# Patient Record
Sex: MALE | Race: WHITE | NOT HISPANIC OR LATINO
[De-identification: names, ages, dates, MRNs, and addresses within clinical notes are randomized per-mention and may not be internally consistent; named-entity substitution may affect disease eponyms.]

---

## 2020-03-02 ENCOUNTER — APPOINTMENT (OUTPATIENT)
Dept: PULMONOLOGY | Facility: CLINIC | Age: 65
End: 2020-03-02
Payer: MEDICARE

## 2020-03-02 VITALS
TEMPERATURE: 98 F | RESPIRATION RATE: 16 BRPM | OXYGEN SATURATION: 94 % | HEART RATE: 70 BPM | SYSTOLIC BLOOD PRESSURE: 140 MMHG | WEIGHT: 220 LBS | BODY MASS INDEX: 30.8 KG/M2 | HEIGHT: 71 IN | DIASTOLIC BLOOD PRESSURE: 76 MMHG

## 2020-03-02 DIAGNOSIS — Z86.79 PERSONAL HISTORY OF OTHER DISEASES OF THE CIRCULATORY SYSTEM: ICD-10-CM

## 2020-03-02 DIAGNOSIS — Z86.39 PERSONAL HISTORY OF OTHER ENDOCRINE, NUTRITIONAL AND METABOLIC DISEASE: ICD-10-CM

## 2020-03-02 PROBLEM — Z00.00 ENCOUNTER FOR PREVENTIVE HEALTH EXAMINATION: Status: ACTIVE | Noted: 2020-03-02

## 2020-03-02 PROCEDURE — 94060 EVALUATION OF WHEEZING: CPT

## 2020-03-02 PROCEDURE — 99204 OFFICE O/P NEW MOD 45 MIN: CPT | Mod: 25

## 2020-03-02 PROCEDURE — 99406 BEHAV CHNG SMOKING 3-10 MIN: CPT

## 2020-03-02 PROCEDURE — 94729 DIFFUSING CAPACITY: CPT

## 2020-03-02 PROCEDURE — 94727 GAS DIL/WSHOT DETER LNG VOL: CPT

## 2020-03-02 RX ORDER — ATORVASTATIN CALCIUM 80 MG/1
TABLET, FILM COATED ORAL
Refills: 0 | Status: ACTIVE | COMMUNITY

## 2020-03-02 RX ORDER — RAMIPRIL 10 MG/1
10 CAPSULE ORAL
Refills: 0 | Status: ACTIVE | COMMUNITY

## 2020-03-02 RX ORDER — METOPROLOL SUCCINATE 200 MG/1
TABLET, EXTENDED RELEASE ORAL
Refills: 0 | Status: ACTIVE | COMMUNITY

## 2020-03-02 NOTE — COUNSELING
[Risk of tobacco use and health benefits of smoking cessation discussed] : Risk of tobacco use and health benefits of smoking cessation discussed [Tobacco Use Cessation Intermediate Greater Than 3 Minutes Up to 10 Minutes] : Tobacco Use Cessation Intermediate Greater Than 3 Minutes Up to 10 Minutes [FreeTextEntry1] : 6

## 2020-03-02 NOTE — PHYSICAL EXAM
[No Acute Distress] : no acute distress [Normal Oropharynx] : normal oropharynx [II] : Mallampati Class: II [Normal Appearance] : normal appearance [Normal S1, S2] : normal s1, s2 [No Resp Distress] : no resp distress [No Abnormalities] : no abnormalities [Benign] : benign [Normal Gait] : normal gait [No Clubbing] : no clubbing [No Focal Deficits] : no focal deficits [No Edema] : no edema [Oriented x3] : oriented x3

## 2020-03-02 NOTE — DISCUSSION/SUMMARY
[FreeTextEntry1] : 64 year old smoker with a history of hypertension, hyperlipidemia, coronary artery disease, S/P CABG presented with two pulmonary nodular opacities at the left leg lung base. Largest was 12 x 6 mm on most recent Chest CT. \par \par Malignancy needs to be ruled out. Will get PET/CT. Also will get images from [facility]. \par \par Smoking cessation discussed. \par \par Follow up after the above.

## 2020-03-02 NOTE — PROCEDURE
[FreeTextEntry1] : PFT 3/2/20: No obstruction. Moderate restriction. Mildly reduced diffusion. No change after bronchodilator. \par \par CT Chest ([facility]) 2/23/15: Scarring at the left lung base. No change from 8/2/12. \par \par CT Chest ([facility]) 12/16/17: Two subpleural nodules at the left base (specific size not reported). No change from prior CT of 2/15. \par \par CT Chest 1/8/20 ([facility]): Emphysematous changes. Two subpleural opacities in the left lower lobe measuring 12 x 6 mm and 9 x 7 mm.

## 2020-03-02 NOTE — HISTORY OF PRESENT ILLNESS
[Current] : current [TextBox_13] : 50 [TextBox_4] : 64 year old smoker referred for pulmonary nodules noted on chest CT. No cough, shortness of breath or hemoptysis. Smoked 1 1/2 packs per day. Has been smoking over 50 years.

## 2020-03-02 NOTE — REVIEW OF SYSTEMS
[Fever] : no fever [Cough] : no cough [Dyspnea] : no dyspnea [Sputum] : no sputum [Wheezing] : no wheezing [Chest Discomfort] : no chest discomfort [Nasal Discharge] : no nasal discharge [Nocturia] : no nocturia [Abdominal Pain] : no abdominal pain [Myalgias] : no myalgias [Rash] : no rash [Headache] : no headache [Anemia] : no anemia [Depression] : no depression [Diabetes] : no diabetes [Thyroid Problem] : no thyroid problem

## 2020-03-16 ENCOUNTER — APPOINTMENT (OUTPATIENT)
Dept: PULMONOLOGY | Facility: CLINIC | Age: 65
End: 2020-03-16
Payer: MEDICARE

## 2020-03-16 VITALS
OXYGEN SATURATION: 97 % | HEIGHT: 71 IN | TEMPERATURE: 98.2 F | BODY MASS INDEX: 31.36 KG/M2 | WEIGHT: 224 LBS | DIASTOLIC BLOOD PRESSURE: 83 MMHG | SYSTOLIC BLOOD PRESSURE: 150 MMHG | RESPIRATION RATE: 16 BRPM | HEART RATE: 67 BPM

## 2020-03-16 VITALS — SYSTOLIC BLOOD PRESSURE: 140 MMHG | DIASTOLIC BLOOD PRESSURE: 78 MMHG

## 2020-03-16 PROCEDURE — 99214 OFFICE O/P EST MOD 30 MIN: CPT

## 2020-03-16 NOTE — PROCEDURE
[FreeTextEntry1] : PFT 3/2/20: No obstruction. Moderate restriction. Mildly reduced diffusion. No change after bronchodilator. \par \par CT Chest ([facility]) 2/23/15: Scarring at the left lung base. No change from 8/2/12. \par \par CT Chest ([facility]) 12/16/17: Two subpleural nodules at the left base (specific size not reported). No change from prior CT of 2/15. \par \par CT Chest 1/8/20 ([facility]): Emphysematous changes. Two subpleural opacities in the left lower lobe measuring 12 x 6 mm and 9 x 7 mm. \par \par PET/CT - 3/10/20: No findings consistent with malignancy.

## 2020-03-16 NOTE — DISCUSSION/SUMMARY
[FreeTextEntry1] : He is a 64 year old smoker with a history of hypertension, hyperlipidemia, coronary artery disease, S/P CABG who presented with two pulmonary nodular opacities at the left leg lung base noted on Chest CT. The largest opacity was 12 x 6 mm on the Chest CT of 1/8/20. PET/CT of 3/8/20 without evidence of malignancy. \par \par No further investigation needed now. Smoking cessation discussed again. \par \par Follow up after the above.

## 2020-03-16 NOTE — HISTORY OF PRESENT ILLNESS
[Current] : current [TextBox_4] : He is a 64 year old smoker referred for pulmonary nodules noted on chest CT. Denied any chronic cough, shortness of breath or hemoptysis. Smokes about 11/2 packs per day. Has been smoking over 50 years.  [TextBox_13] : 50

## 2020-03-16 NOTE — COUNSELING
[Risk of tobacco use and health benefits of smoking cessation discussed] : Risk of tobacco use and health benefits of smoking cessation discussed [Tobacco Use Cessation Intermediate Greater Than 3 Minutes Up to 10 Minutes] : Tobacco Use Cessation Intermediate Greater Than 3 Minutes Up to 10 Minutes [FreeTextEntry1] : 5

## 2020-03-16 NOTE — REVIEW OF SYSTEMS
[Fever] : no fever [Chills] : no chills [Cough] : no cough [Hemoptysis] : no hemoptysis [Sputum] : no sputum [Dyspnea] : no dyspnea [Pleuritic Pain] : no pleuritic pain [Wheezing] : no wheezing [Chest Discomfort] : no chest discomfort [Nasal Discharge] : no nasal discharge [Abdominal Pain] : no abdominal pain [Nocturia] : no nocturia [Myalgias] : no myalgias [Rash] : no rash [Anemia] : no anemia [Headache] : no headache [Depression] : no depression [Diabetes] : no diabetes [Thyroid Problem] : no thyroid problem

## 2020-03-16 NOTE — PHYSICAL EXAM
[Normal Oropharynx] : normal oropharynx [II] : Mallampati Class: II [Normal Appearance] : normal appearance [Normal S1, S2] : normal s1, s2 [No Resp Distress] : no resp distress [No Abnormalities] : no abnormalities [Benign] : benign [Normal Gait] : normal gait [No Clubbing] : no clubbing [No Edema] : no edema [No Focal Deficits] : no focal deficits [Oriented x3] : oriented x3 [Well Groomed] : well groomed [Not Tender] : not tender [TextBox_80] : Few rhonchi noted

## 2021-04-01 ENCOUNTER — APPOINTMENT (OUTPATIENT)
Dept: PULMONOLOGY | Facility: CLINIC | Age: 66
End: 2021-04-01
Payer: MEDICARE

## 2021-04-01 VITALS
WEIGHT: 224 LBS | OXYGEN SATURATION: 95 % | RESPIRATION RATE: 16 BRPM | BODY MASS INDEX: 31.36 KG/M2 | HEIGHT: 71 IN | HEART RATE: 63 BPM | DIASTOLIC BLOOD PRESSURE: 80 MMHG | TEMPERATURE: 98 F | SYSTOLIC BLOOD PRESSURE: 142 MMHG

## 2021-04-01 PROCEDURE — 99213 OFFICE O/P EST LOW 20 MIN: CPT

## 2021-04-01 NOTE — REVIEW OF SYSTEMS
[Fever] : no fever [Cough] : no cough [Hemoptysis] : no hemoptysis [Sputum] : no sputum [Dyspnea] : no dyspnea [Wheezing] : no wheezing [Chest Discomfort] : no chest discomfort [Nasal Discharge] : no nasal discharge [Abdominal Pain] : no abdominal pain [Nocturia] : no nocturia [Myalgias] : no myalgias [Rash] : no rash [Anemia] : no anemia [Headache] : no headache [Depression] : no depression [Diabetes] : no diabetes [Thyroid Problem] : no thyroid problem

## 2021-04-01 NOTE — DISCUSSION/SUMMARY
[FreeTextEntry1] : He is a 65 year old smoker with a history of hypertension, hyperlipidemia, coronary artery disease, S/P CABG who presented with two pulmonary nodular opacities at the left leg lung base noted on Chest CT. The largest opacity was 12 x 6 mm on the Chest CT of 1/8/20. PET/CT of 3/8/20 without evidence of malignancy. \par \par I will obtain the recent CT results and review them. If necessary a followup CT of the lungs will be obtained.\par \par Smoking cessation discussed again. \par \par Followup in 6 months advised.

## 2021-04-01 NOTE — COUNSELING
[Risk of tobacco use and health benefits of smoking cessation discussed] : Risk of tobacco use and health benefits of smoking cessation discussed [FreeTextEntry1] : 5

## 2021-04-01 NOTE — HISTORY OF PRESENT ILLNESS
[Current] : current [TextBox_4] : He is a 65 year old smoker referred for pulmonary nodules noted on chest CT. \par \par He recently had a followup CT for his aortic aneurysm. He did not have the results with him.\par \par Denied any chronic cough, shortness of breath or hemoptysis. \par \par Smokes about 1 pack per day. Has been smoking over 50 years.  [TextBox_11] : 1 [TextBox_13] : 50

## 2021-04-01 NOTE — PHYSICAL EXAM
[Well Groomed] : well groomed [II] : Mallampati Class: II [Normal Appearance] : normal appearance [Normal S1, S2] : normal s1, s2 [No Resp Distress] : no resp distress [No Abnormalities] : no abnormalities [Not Tender] : not tender [Normal Gait] : normal gait [No Clubbing] : no clubbing [No Edema] : no edema [No Focal Deficits] : no focal deficits [Oriented x3] : oriented x3 [No HSM] : no hsm

## 2021-05-27 ENCOUNTER — NON-APPOINTMENT (OUTPATIENT)
Age: 66
End: 2021-05-27

## 2021-10-07 ENCOUNTER — APPOINTMENT (OUTPATIENT)
Dept: PULMONOLOGY | Facility: CLINIC | Age: 66
End: 2021-10-07
Payer: MEDICARE

## 2021-10-07 VITALS
HEART RATE: 63 BPM | DIASTOLIC BLOOD PRESSURE: 78 MMHG | SYSTOLIC BLOOD PRESSURE: 154 MMHG | TEMPERATURE: 97.3 F | WEIGHT: 222 LBS | BODY MASS INDEX: 30.96 KG/M2 | OXYGEN SATURATION: 97 %

## 2021-10-07 VITALS — SYSTOLIC BLOOD PRESSURE: 142 MMHG | DIASTOLIC BLOOD PRESSURE: 78 MMHG

## 2021-10-07 PROCEDURE — 99213 OFFICE O/P EST LOW 20 MIN: CPT

## 2021-10-07 NOTE — DISCUSSION/SUMMARY
[FreeTextEntry1] : He is a 66 year old smoker with a history of hypertension, hyperlipidemia, coronary artery disease, S/P CABG who presented with two pulmonary nodular opacities at the left leg lung base noted on Chest CT. The largest opacity was 12 x 6 mm on the Chest CT of 1/8/20. PET/CT of 3/8/20 without evidence of malignancy. CT 5/13//21: Scarring unchanged. No suspicious nodules. \par \par No evidence of malignancy. Smoking cessation advised. Follow up CT in 6 months. \par \par Needs new PCP. Dr. Bland has retired.

## 2021-10-07 NOTE — PHYSICAL EXAM
[Well Groomed] : well groomed [II] : Mallampati Class: II [Normal Appearance] : normal appearance [Normal S1, S2] : normal s1, s2 [No Resp Distress] : no resp distress [No Abnormalities] : no abnormalities [Not Tender] : not tender [No HSM] : no hsm [Normal Gait] : normal gait [No Clubbing] : no clubbing [No Edema] : no edema [No Focal Deficits] : no focal deficits [Oriented x3] : oriented x3

## 2022-01-20 ENCOUNTER — APPOINTMENT (OUTPATIENT)
Dept: UROLOGY | Facility: CLINIC | Age: 67
End: 2022-01-20
Payer: MEDICARE

## 2022-01-20 VITALS
HEIGHT: 71 IN | SYSTOLIC BLOOD PRESSURE: 158 MMHG | BODY MASS INDEX: 30.8 KG/M2 | TEMPERATURE: 98 F | HEART RATE: 67 BPM | DIASTOLIC BLOOD PRESSURE: 77 MMHG | WEIGHT: 220 LBS

## 2022-01-20 VITALS — DIASTOLIC BLOOD PRESSURE: 72 MMHG | SYSTOLIC BLOOD PRESSURE: 139 MMHG

## 2022-01-20 DIAGNOSIS — Z12.5 ENCOUNTER FOR SCREENING FOR MALIGNANT NEOPLASM OF PROSTATE: ICD-10-CM

## 2022-01-20 DIAGNOSIS — F17.200 NICOTINE DEPENDENCE, UNSPECIFIED, UNCOMPLICATED: ICD-10-CM

## 2022-01-20 PROCEDURE — 99204 OFFICE O/P NEW MOD 45 MIN: CPT

## 2022-01-20 PROCEDURE — 99406 BEHAV CHNG SMOKING 3-10 MIN: CPT

## 2022-01-20 RX ORDER — CLOPIDOGREL 75 MG/1
75 TABLET, FILM COATED ORAL
Refills: 0 | Status: ACTIVE | COMMUNITY

## 2022-01-20 RX ORDER — ASPIRIN 81 MG
81 TABLET, DELAYED RELEASE (ENTERIC COATED) ORAL
Refills: 0 | Status: ACTIVE | COMMUNITY

## 2022-01-21 NOTE — LETTER BODY
[FreeTextEntry1] : Wilbur Austin MD\par [address], \[place] [address]\par \par Dear Dr. Austin,\par \par Kailash Naranjo presents to the office today. He is a 66-year-old man who is here today for evaluation of lower urinary tract symptoms and prostate cancer screening. He has undergone previous treatment for BPH with greenlight photo vaporization of the prostate about 2 years ago. He says this "worked a little" and at that time his symptoms were urgency and urge urinary incontinence. He can hold his urine better now than he used to be able to but still has some urgency and frequency symptoms. He states that his flow is good at times and less so at others. He denies any sensation of incomplete bladder emptying. There is no gross hematuria or dysuria. He denies any urinary tract infections.\par \par Of note, the patient is a smoker. He is a former cocaine user. He has history of coronary artery disease with prior stenting and a coronary artery bypass. He also states that he " thinks he also has a stent in his leg."\par \par We reviewed his urinary symptoms today and potential treatment options to consider. I think he likely has still some element of BPH and bladder outlet obstruction related to his ongoing symptoms of frequency and urgency. He is clearly better than he had been prior to the greenlight laser procedure. I offered him options of medical therapy but he would prefer to remain off of any medication at this time unless his symptoms start to be progressively worse.\par \par We discussed prostate cancer screening today also in detail. He is unaware of prior PSA levels and would like it checked today which I did for him. His PSA was 3.77 ng/mL with 53% free fraction. These levels are favorable and I do not think that he needs to consider undergoing prostate imaging or biopsy at this time. I would recommend he continue to have his PSA checked on an intermittent basis, yearly would be fine in my opinion.\par \par He also asked today about erectile dysfunction. He says that he has not had a good quality erection for years and does not have the ability to achieve partial erection adequate for penetration. We did discuss options for management such as oral PDE 5 inhibitors and penile injection treatments. He says that he has tried these in the past and has not had success. He is potentially interested in penile implant surgery. I have referred him to a colleague with expertise in urologic prosthetic surgery for further discussion.\par \par I will plan to see him back again next year. If his urinary symptoms get any worse in the interval, I would be happy to see him at any time.\par \par Sincerely,\par \par \par \par \par Vern Mcdonnell MD, FACS\par  of Urology\par Residency \par [facility] [employer] [facility]\par \par [facility]\par Director of Robotics and Minimally Invasive Surgery\par [address] [place] [address]\[place] [address]\par P: [phone]\par F: [phone]\par [facility]

## 2022-01-21 NOTE — ASSESSMENT
[FreeTextEntry1] : I had an approximately 5-minute discussion today with the patient regarding smoking cessation which was separate from the rest of the visit.  I have explained that smoking can have association with urinary tract malignancy including urothelial cell carcinoma.  We also reviewed the multiple other benefits of smoking cessation including cardiovascular disease, pulmonary disease, renal disease, increased risk of malignancy, amongst many others.\par

## 2022-01-25 LAB
PSA FREE FLD-MCNC: 53 %
PSA FREE SERPL-MCNC: 1.98 NG/ML
PSA SERPL-MCNC: 3.77 NG/ML

## 2022-04-07 ENCOUNTER — APPOINTMENT (OUTPATIENT)
Dept: PULMONOLOGY | Facility: CLINIC | Age: 67
End: 2022-04-07

## 2022-04-14 ENCOUNTER — APPOINTMENT (OUTPATIENT)
Dept: PULMONOLOGY | Facility: CLINIC | Age: 67
End: 2022-04-14

## 2022-05-02 ENCOUNTER — APPOINTMENT (OUTPATIENT)
Dept: PULMONOLOGY | Facility: CLINIC | Age: 67
End: 2022-05-02
Payer: MEDICARE

## 2022-05-02 VITALS
WEIGHT: 222 LBS | HEART RATE: 62 BPM | SYSTOLIC BLOOD PRESSURE: 130 MMHG | DIASTOLIC BLOOD PRESSURE: 66 MMHG | BODY MASS INDEX: 30.96 KG/M2 | TEMPERATURE: 97.8 F | OXYGEN SATURATION: 95 %

## 2022-05-02 DIAGNOSIS — Z72.0 TOBACCO USE: ICD-10-CM

## 2022-05-02 DIAGNOSIS — R91.1 SOLITARY PULMONARY NODULE: ICD-10-CM

## 2022-05-02 PROCEDURE — 99213 OFFICE O/P EST LOW 20 MIN: CPT

## 2022-05-02 NOTE — DISCUSSION/SUMMARY
[FreeTextEntry1] : He is a 66 year old smoker with a history of hypertension, hyperlipidemia, coronary artery disease, S/P CABG who presented with two pulmonary nodular opacities at the left leg lung base noted on Chest CT. The largest opacity was 12 x 6 mm on the Chest CT of 1/8/20. PET/CT of 3/8/20 without evidence of malignancy. CT 5/13//21: Scarring unchanged. No suspicious nodules. \par \par CT Chest 4/7/22: Stable 3 mm RUL nodule. Stable scarring. \par \par His pulmonary status is stable. \par \par Follow up CT Chest in one year advised. Smoking cessation discussed. \par

## 2022-05-02 NOTE — HISTORY OF PRESENT ILLNESS
[Current] : current [TextBox_4] : He is a 65 year old smoker referred for pulmonary nodules noted on chest CT. \par \par The patient came for a scheduled follow up visit today. \par \par  [TextBox_11] : 1 [TextBox_13] : 50

## 2022-05-02 NOTE — REVIEW OF SYSTEMS
[Fever] : no fever [Cough] : no cough [Hemoptysis] : no hemoptysis [Dyspnea] : no dyspnea [Wheezing] : no wheezing [Chest Discomfort] : no chest discomfort [Nasal Discharge] : no nasal discharge [Abdominal Pain] : no abdominal pain [Nocturia] : no nocturia [Myalgias] : no myalgias [Rash] : no rash [Anemia] : no anemia [Headache] : no headache [Depression] : no depression [Diabetes] : no diabetes [Thyroid Problem] : no thyroid problem

## 2022-05-02 NOTE — PROCEDURE
[FreeTextEntry1] : PFT 3/2/20: No obstruction. Moderate restriction. Mildly reduced diffusion. No change after bronchodilator. \par \par CT Chest ([facility]) 2/23/15: Scarring at the left lung base. No change from 8/2/12. \par \par CT Chest ([facility]) 12/16/17: Two subpleural nodules at the left base (specific size not reported). No change from prior CT of 2/15. \par \par CT Chest 1/8/20 ([facility]): Emphysematous changes. Two subpleural opacities in the left lower lobe measuring 12 x 6 mm and 9 x 7 mm. \par \par PET/CT - 3/10/20: No findings consistent with malignancy. \par \par CT Chest 4/7/22: Stable 3 mm RUL nodule. Stable scarring. \par

## 2022-08-30 ENCOUNTER — APPOINTMENT (OUTPATIENT)
Dept: UROLOGY | Facility: CLINIC | Age: 67
End: 2022-08-30

## 2022-08-30 VITALS
SYSTOLIC BLOOD PRESSURE: 139 MMHG | HEART RATE: 59 BPM | DIASTOLIC BLOOD PRESSURE: 64 MMHG | RESPIRATION RATE: 17 BRPM | HEIGHT: 71 IN | TEMPERATURE: 98.2 F

## 2022-08-30 DIAGNOSIS — N52.9 MALE ERECTILE DYSFUNCTION, UNSPECIFIED: ICD-10-CM

## 2022-08-30 PROCEDURE — 99214 OFFICE O/P EST MOD 30 MIN: CPT

## 2022-08-30 RX ORDER — ALPROSTADIL 500 UG/ML
500 INJECTION, SOLUTION, CONCENTRATE INTRAVASCULAR
Qty: 2 | Refills: 0 | Status: ACTIVE | COMMUNITY
Start: 2022-08-30 | End: 1900-01-01

## 2022-08-30 NOTE — PHYSICAL EXAM
[General Appearance - Well Developed] : well developed [General Appearance - Well Nourished] : well nourished [Normal Appearance] : normal appearance [Well Groomed] : well groomed [General Appearance - In No Acute Distress] : no acute distress [Urethral Meatus] : meatus normal [Testes Tenderness] : no tenderness of the testes [Testes Mass (___cm)] : there were no testicular masses [FreeTextEntry1] : stretched penile length demonstrated to pt.  Skin adhesion noted on glans to foreskin

## 2022-08-30 NOTE — ASSESSMENT
[FreeTextEntry1] : Patient is a 67 yo M who presents with severe ED.\par \par Reviewed treatment options.\par Discussed at length the penile prosthesis surgery.  Discussed that there is a malleable prosthesis as well as inflatable prosthesis.  Patient is more interested in the inflatable penile prosthesis.  Discussed risks/benefits/alternatives of procedure and typical postoperative course including no use/sex for 4-6 wks after surgery.  Risks including but not limited to bleeding, infection, penile length shortening, device malfunction, erosion, perforation, and postoperative pain discussed at length.  I also emphasized that this is a permanent ED procedure, pt understands that he cannot resume oral or injectable medication for erections after prosthesis surgery. Pt also understands that the typical life expectancy of the device is ~10 years and that it may need to be revised in the future.\par After learning about his penile length, stretched penile length he is less interested in penile implant.\par D/w pt that given he had not tried ICI, would give trial and perform penile doppler.\par He is willing to undergo penile doppler ULT\par F/u for penile doppler ULT\par

## 2022-08-30 NOTE — HISTORY OF PRESENT ILLNESS
[FreeTextEntry1] : Patient is a 65 yo M who presents for ED.\par He has tried multiple oral pills - cialis, viagra.\par He has not tried ICI and has read about penile prosthesis.  He is interested in penile prosthesis.\par He had CABG 15 yrs ago, no CP.  No nitrate use.  He walks for 1 mile on exercise machine without CP/SOB.\par \par He has not had intercourse in 8 yrs, he is  but his wife is not overly bothered by lack of sex.\par

## 2022-09-17 ENCOUNTER — EMERGENCY (EMERGENCY)
Facility: HOSPITAL | Age: 67
LOS: 1 days | Discharge: LEFT BEFORE DISCHARGE | End: 2022-09-17
Attending: STUDENT IN AN ORGANIZED HEALTH CARE EDUCATION/TRAINING PROGRAM | Admitting: STUDENT IN AN ORGANIZED HEALTH CARE EDUCATION/TRAINING PROGRAM

## 2022-09-17 VITALS
TEMPERATURE: 98 F | OXYGEN SATURATION: 99 % | HEART RATE: 67 BPM | SYSTOLIC BLOOD PRESSURE: 159 MMHG | RESPIRATION RATE: 17 BRPM | DIASTOLIC BLOOD PRESSURE: 65 MMHG

## 2022-09-17 LAB
ALBUMIN SERPL ELPH-MCNC: 3.6 G/DL — SIGNIFICANT CHANGE UP (ref 3.3–5)
ALP SERPL-CCNC: 44 U/L — SIGNIFICANT CHANGE UP (ref 40–120)
ALT FLD-CCNC: 18 U/L — SIGNIFICANT CHANGE UP (ref 4–41)
ANION GAP SERPL CALC-SCNC: 13 MMOL/L — SIGNIFICANT CHANGE UP (ref 7–14)
APPEARANCE UR: CLEAR — SIGNIFICANT CHANGE UP
APTT BLD: 30.3 SEC — SIGNIFICANT CHANGE UP (ref 27–36.3)
AST SERPL-CCNC: 17 U/L — SIGNIFICANT CHANGE UP (ref 4–40)
BASOPHILS # BLD AUTO: 0.02 K/UL — SIGNIFICANT CHANGE UP (ref 0–0.2)
BASOPHILS NFR BLD AUTO: 0.2 % — SIGNIFICANT CHANGE UP (ref 0–2)
BILIRUB SERPL-MCNC: 0.4 MG/DL — SIGNIFICANT CHANGE UP (ref 0.2–1.2)
BILIRUB UR-MCNC: NEGATIVE — SIGNIFICANT CHANGE UP
BUN SERPL-MCNC: 117 MG/DL — HIGH (ref 7–23)
CALCIUM SERPL-MCNC: 8.6 MG/DL — SIGNIFICANT CHANGE UP (ref 8.4–10.5)
CHLORIDE SERPL-SCNC: 107 MMOL/L — SIGNIFICANT CHANGE UP (ref 98–107)
CK SERPL-CCNC: 78 U/L — SIGNIFICANT CHANGE UP (ref 30–200)
CO2 SERPL-SCNC: 18 MMOL/L — LOW (ref 22–31)
COLOR SPEC: YELLOW — SIGNIFICANT CHANGE UP
CREAT SERPL-MCNC: 2.4 MG/DL — HIGH (ref 0.5–1.3)
DIFF PNL FLD: NEGATIVE — SIGNIFICANT CHANGE UP
EGFR: 29 ML/MIN/1.73M2 — LOW
EOSINOPHIL # BLD AUTO: 0.08 K/UL — SIGNIFICANT CHANGE UP (ref 0–0.5)
EOSINOPHIL NFR BLD AUTO: 0.8 % — SIGNIFICANT CHANGE UP (ref 0–6)
GLUCOSE SERPL-MCNC: 165 MG/DL — HIGH (ref 70–99)
GLUCOSE UR QL: ABNORMAL
HCT VFR BLD CALC: 40.1 % — SIGNIFICANT CHANGE UP (ref 39–50)
HGB BLD-MCNC: 13.4 G/DL — SIGNIFICANT CHANGE UP (ref 13–17)
IANC: 7.6 K/UL — HIGH (ref 1.8–7.4)
IMM GRANULOCYTES NFR BLD AUTO: 0.6 % — SIGNIFICANT CHANGE UP (ref 0–0.9)
INR BLD: 1.14 RATIO — SIGNIFICANT CHANGE UP (ref 0.88–1.16)
KETONES UR-MCNC: NEGATIVE — SIGNIFICANT CHANGE UP
LEUKOCYTE ESTERASE UR-ACNC: NEGATIVE — SIGNIFICANT CHANGE UP
LYMPHOCYTES # BLD AUTO: 0.92 K/UL — LOW (ref 1–3.3)
LYMPHOCYTES # BLD AUTO: 9.4 % — LOW (ref 13–44)
MCHC RBC-ENTMCNC: 31.2 PG — SIGNIFICANT CHANGE UP (ref 27–34)
MCHC RBC-ENTMCNC: 33.4 GM/DL — SIGNIFICANT CHANGE UP (ref 32–36)
MCV RBC AUTO: 93.5 FL — SIGNIFICANT CHANGE UP (ref 80–100)
MONOCYTES # BLD AUTO: 1.14 K/UL — HIGH (ref 0–0.9)
MONOCYTES NFR BLD AUTO: 11.6 % — SIGNIFICANT CHANGE UP (ref 2–14)
NEUTROPHILS # BLD AUTO: 7.6 K/UL — HIGH (ref 1.8–7.4)
NEUTROPHILS NFR BLD AUTO: 77.4 % — HIGH (ref 43–77)
NITRITE UR-MCNC: NEGATIVE — SIGNIFICANT CHANGE UP
NRBC # BLD: 0 /100 WBCS — SIGNIFICANT CHANGE UP (ref 0–0)
NRBC # FLD: 0 K/UL — SIGNIFICANT CHANGE UP (ref 0–0)
NT-PROBNP SERPL-SCNC: 6349 PG/ML — HIGH
PH UR: 6 — SIGNIFICANT CHANGE UP (ref 5–8)
PLATELET # BLD AUTO: 104 K/UL — LOW (ref 150–400)
POTASSIUM SERPL-MCNC: 4.5 MMOL/L — SIGNIFICANT CHANGE UP (ref 3.5–5.3)
POTASSIUM SERPL-SCNC: 4.5 MMOL/L — SIGNIFICANT CHANGE UP (ref 3.5–5.3)
PROT SERPL-MCNC: 6.3 G/DL — SIGNIFICANT CHANGE UP (ref 6–8.3)
PROT UR-MCNC: ABNORMAL
PROTHROM AB SERPL-ACNC: 13.2 SEC — SIGNIFICANT CHANGE UP (ref 10.5–13.4)
RBC # BLD: 4.29 M/UL — SIGNIFICANT CHANGE UP (ref 4.2–5.8)
RBC # FLD: 13.7 % — SIGNIFICANT CHANGE UP (ref 10.3–14.5)
SODIUM SERPL-SCNC: 138 MMOL/L — SIGNIFICANT CHANGE UP (ref 135–145)
SP GR SPEC: 1.02 — SIGNIFICANT CHANGE UP (ref 1.01–1.05)
TROPONIN T, HIGH SENSITIVITY RESULT: 1073 NG/L — CRITICAL HIGH
TROPONIN T, HIGH SENSITIVITY RESULT: 1166 NG/L — CRITICAL HIGH
UROBILINOGEN FLD QL: SIGNIFICANT CHANGE UP
WBC # BLD: 9.82 K/UL — SIGNIFICANT CHANGE UP (ref 3.8–10.5)
WBC # FLD AUTO: 9.82 K/UL — SIGNIFICANT CHANGE UP (ref 3.8–10.5)

## 2022-09-17 PROCEDURE — 93010 ELECTROCARDIOGRAM REPORT: CPT

## 2022-09-17 PROCEDURE — 99291 CRITICAL CARE FIRST HOUR: CPT | Mod: FT,CS,25

## 2022-09-17 PROCEDURE — 71046 X-RAY EXAM CHEST 2 VIEWS: CPT | Mod: 26

## 2022-09-17 RX ORDER — FUROSEMIDE 40 MG
40 TABLET ORAL ONCE
Refills: 0 | Status: COMPLETED | OUTPATIENT
Start: 2022-09-17 | End: 2022-09-17

## 2022-09-17 RX ORDER — ASPIRIN/CALCIUM CARB/MAGNESIUM 324 MG
325 TABLET ORAL ONCE
Refills: 0 | Status: COMPLETED | OUTPATIENT
Start: 2022-09-17 | End: 2022-09-17

## 2022-09-17 RX ORDER — HEPARIN SODIUM 5000 [USP'U]/ML
6000 INJECTION INTRAVENOUS; SUBCUTANEOUS EVERY 6 HOURS
Refills: 0 | Status: DISCONTINUED | OUTPATIENT
Start: 2022-09-17 | End: 2022-09-21

## 2022-09-17 RX ORDER — HEPARIN SODIUM 5000 [USP'U]/ML
5000 INJECTION INTRAVENOUS; SUBCUTANEOUS ONCE
Refills: 0 | Status: DISCONTINUED | OUTPATIENT
Start: 2022-09-17 | End: 2022-09-21

## 2022-09-17 RX ORDER — HEPARIN SODIUM 5000 [USP'U]/ML
INJECTION INTRAVENOUS; SUBCUTANEOUS
Qty: 25000 | Refills: 0 | Status: DISCONTINUED | OUTPATIENT
Start: 2022-09-17 | End: 2022-09-21

## 2022-09-17 RX ADMIN — Medication 325 MILLIGRAM(S): at 22:17

## 2022-09-17 RX ADMIN — Medication 40 MILLIGRAM(S): at 22:05

## 2022-09-17 NOTE — CONSULT NOTE ADULT - ASSESSMENT
65yo M with PMHx of HTN, HLD, DM, CABG 14 years ago, Gout, recently discharged from [facility] 5 days ago for COVID infection who presents to the ED with complaints of productive cough, fatigue, LE edema. In the ED, labs significant for elevated troponin (1100) and elevated pro-BNP. Cardiology consulted patient seen and examined at the bedside. States he has been partially compliant with medications since discharge from [facility] but overall has not been feeling well. Currently denies headaches, dizziness, chest pain, palpitations, KANG, shortness of breath, abdominal pain, N/V. Vitals obtained at the bedside WNL at this time. EKG obtained without JR or depressions concerning for active ischemic event. Patient reports he follows with Cardiology at [facility], had cardiac cath 1 year ago which was "normal". Case discussed with Dr. Rosenbaum.       -- Troponemia ? secondary to COVID myocarditis vs NSTEMI  -- Tele monitoring  -- Load with ASA, Plavix, Heparin   -- Start heparin gtt. Check PTT  -- Trend cardiac enzymes to peak. Add on CK, CK-MB  -- ECHO in AM  -- Check A1c, lipids, TSH  -- Lasix 40 IVP x 1. PRN diuresis for now  -- Continue with oral anti-hypertensives  -- HOLD Entresto for ALTAGRACIA vs CKD. Consider Renal eval  -- Optimize medically for possible LHC

## 2022-09-17 NOTE — ED PROVIDER NOTE - CLINICAL SUMMARY MEDICAL DECISION MAKING FREE TEXT BOX
67 yo M hx of htn, hf, ckd 3 presents with fatigue, cough, lower extremity swelling. Vitals WNL, mild 1+ pitting edema at ankles. Will rule out ACS, HF exacerbation, pneumonia. Low concern for PE given no tachycardia, cp, 98% O2 saturation. Likely residual effects of COVID. Will rule out UTI.

## 2022-09-17 NOTE — ED PROVIDER NOTE - OBJECTIVE STATEMENT
67 yo M PMHx of CKD stage 3, heart failure, HTN presents with 10 days of weakness and leg swelling. Was admitted at [facility] for covid on bipap, d/whit 10 days ago. Since then patient has had subjective fever, cough with yellow sputum, weakness, increased bilateral leg swelling. Endorses mild SOB. Says he has gained 8 lbs since discharge. Has missed some of his diuretic doses at home. Also endorses urinary retention and that he had retention on his last hospital stay. No CP, nausea, vomiting, abd pain.

## 2022-09-17 NOTE — ED PROVIDER NOTE - PROGRESS NOTE DETAILS
+ trop elevated in 1000, pt w/ recent covid infection, concern for possible acs w/ sob and hx . aspirin, cards, consult, admission . will trend ck, trop Patient would like to AMA. I have explained that he is having an NSTEMI is simple language, patient says he understands the risks but still would like to leave. I expressed that this is a life threatening condition and there are new concerns in addition to his usually elevated BNP and creatinine (his troponin is 1100). He says he understands. I spoke to 2 daughters who expressed that he is depressed and should be sedated. However patient has capacity, has not expressed SI to medical team here. I explained this cannot be done as he has capacity. Patient was scheduled to be given his nightly Ambien however he refused. Patient left his room with his IV. Nurse went out to remove IV. I explained risks again once more and patient expresses understanding. Given return precautions to return if chest pain or shortness of breath increases.     Kala Buchanan MD, PGY2

## 2022-09-17 NOTE — ED PROVIDER NOTE - PHYSICAL EXAMINATION
GENERAL: no acute distress, non-toxic appearing  HEAD: normocephalic, atraumatic  HEENT: normal conjunctiva, oral mucosa moist, neck supple  CARDIAC: regular rate and rhythm, normal S1 and S2,  no appreciable murmurs  PULM: clear to ascultation bilaterally, no crackles, rales, rhonchi, or wheezing  GI: abdomen nondistended, soft, nontender, no guarding or rebound tenderness  NEURO: alert and oriented x 3, normal speech, moving all extremities   MSK: + 1+ pitting edema bilateral ankles, no visible deformities, no peripheral edema, calf tenderness/redness/swelling  SKIN: no visible rashes, dry, well-perfused  PSYCH: appropriate mood and affect

## 2022-09-17 NOTE — ED ADULT NURSE NOTE - CHIEF COMPLAINT QUOTE
Pt arrives ambulatory to triage c/o lethargy, difficulty urinating, diarrhea, and swelling to bilat LE x4 days. Pt recently dc'd from [facility] with covid infection, received 5 day tx of monoclonal antibodies and was put on bipap. Gained 8 lbs in the last 8 days. PMH: CHF, HTN, stg 3 CKD.

## 2022-09-17 NOTE — ED ADULT NURSE NOTE - NSFALLRSKASSESSTYPE_ED_ALL_ED
Called and spoke with pt dad, scheduling information given to dad, no further questions.     Jay CHOI RN     Initial (On Arrival)

## 2022-09-17 NOTE — ED ADULT TRIAGE NOTE - CHIEF COMPLAINT QUOTE
Pt arrives ambulatory to triage c/o lethargy, difficulty urinating, diarrhea, and swelling to bilat LE x4 days. Pt recently dc'd from [facility] with covid infection, received 5 day tx of monoclonal antibodies and was put on bipap. Gained 8 lbs in the last 8 days. PMH: CHF, HTN, stg 3 CKD.

## 2022-09-17 NOTE — ED PROVIDER NOTE - CARE PLAN
Principal Discharge DX:	Non-ST elevation MI (NSTEMI)  Secondary Diagnosis:	ALTAGRACIA (acute kidney injury)   1

## 2022-09-17 NOTE — ED ADULT NURSE NOTE - OBJECTIVE STATEMENT
alert oriented no distress 1+ annie LE edema noted states he has gained 10lbs and feels lethargic no c/o dizziness CP or SOB skin warm color good

## 2022-09-17 NOTE — ED PROVIDER NOTE - WR ORDER NAME 1
Called patient and offered work in appt with Dr. Mcgregor at 4:20pm. Patient declined and stated she cannot come in today. She would like to keep appt for 8/9 and was advised to go to [facility] for worsening symptoms. Patient in agreement.    Xray Chest 2 Views PA/Lat

## 2022-09-17 NOTE — ED PROVIDER NOTE - ATTENDING CONTRIBUTION TO CARE
67 y/o M with PMH CHF, HTN, CKD 3 p/w generalized fatigue , difficulty urinating, diarrhea, weight gain. pt recently admitted for covid 19 and received monoclonal antibodies and required  bipap. Pt reports having some shortness of breath with exertion denies chest pain dizziness.  pt states he had some mild diarrhea which improved with pepto bismal. pt states he gained about 8 lbs since admission at OSH. pt sat 100% , denies falls, dizzines, states he has generally felt fatigued since having covid. he denies recent abx use. bedside us showing <150ml urine   denies fever, chills, chest pain, SOB, abdominal pain, diarrhea, dysuria, syncope, bleeding, new rash,weakness, numbness, blurred vision  + fatigue   ROS  otherwise negative as per HPI  Gen: Awake, Alert, WD, WN, NAD  Head:  NC/AT  Eyes:  PERRL, EOMI, Conjunctiva pink, lids normal, no scleral icterus  ENT: OP clear, no exudates, moist mucus membranes  Neck: supple, nontender, no meningismus, no JVD, trachea midline  Cardiac/CV:  S1 S2, RRR, no M/G/R  Respiratory/Pulm:  CTAB, good air movement, normal resp effort, no wheezes/stridor/retractions/rales/rhonchi  Gastrointestinal/Abdomen:  Soft, nontender, nondistended, +BS, no rebound/guarding  Back:  no CVAT, no MLT  Ext:  warm, well perfused, moving all extremities spontaneously, no peripheral edema, distal pulses intact  Skin: intact, no rash  Neuro:  AAOx3, sensation intact, motor 5/5 x 4 extremities, normal gait, speech clear  mdm as above

## 2022-09-18 ENCOUNTER — INPATIENT (INPATIENT)
Facility: HOSPITAL | Age: 67
LOS: 4 days | Discharge: HOME | End: 2022-09-23
Attending: INTERNAL MEDICINE | Admitting: INTERNAL MEDICINE

## 2022-09-18 VITALS
TEMPERATURE: 98 F | DIASTOLIC BLOOD PRESSURE: 82 MMHG | HEART RATE: 70 BPM | SYSTOLIC BLOOD PRESSURE: 156 MMHG | RESPIRATION RATE: 18 BRPM | OXYGEN SATURATION: 96 %

## 2022-09-18 VITALS — WEIGHT: 227.96 LBS

## 2022-09-18 DIAGNOSIS — N18.30 CHRONIC KIDNEY DISEASE, STAGE 3 UNSPECIFIED: ICD-10-CM

## 2022-09-18 DIAGNOSIS — D69.6 THROMBOCYTOPENIA, UNSPECIFIED: ICD-10-CM

## 2022-09-18 DIAGNOSIS — N17.9 ACUTE KIDNEY FAILURE, UNSPECIFIED: ICD-10-CM

## 2022-09-18 DIAGNOSIS — U07.1 COVID-19: ICD-10-CM

## 2022-09-18 DIAGNOSIS — R73.03 PREDIABETES: ICD-10-CM

## 2022-09-18 DIAGNOSIS — I21.4 NON-ST ELEVATION (NSTEMI) MYOCARDIAL INFARCTION: ICD-10-CM

## 2022-09-18 DIAGNOSIS — Z95.1 PRESENCE OF AORTOCORONARY BYPASS GRAFT: Chronic | ICD-10-CM

## 2022-09-18 DIAGNOSIS — I50.9 HEART FAILURE, UNSPECIFIED: ICD-10-CM

## 2022-09-18 DIAGNOSIS — I50.21 ACUTE SYSTOLIC (CONGESTIVE) HEART FAILURE: ICD-10-CM

## 2022-09-18 DIAGNOSIS — I25.10 ATHEROSCLEROTIC HEART DISEASE OF NATIVE CORONARY ARTERY WITHOUT ANGINA PECTORIS: ICD-10-CM

## 2022-09-18 DIAGNOSIS — Z29.9 ENCOUNTER FOR PROPHYLACTIC MEASURES, UNSPECIFIED: ICD-10-CM

## 2022-09-18 LAB
ALBUMIN SERPL ELPH-MCNC: 3.4 G/DL — SIGNIFICANT CHANGE UP (ref 3.3–5)
ALP SERPL-CCNC: 40 U/L — SIGNIFICANT CHANGE UP (ref 40–120)
ALT FLD-CCNC: 19 U/L — SIGNIFICANT CHANGE UP (ref 4–41)
ANION GAP SERPL CALC-SCNC: 10 MMOL/L — SIGNIFICANT CHANGE UP (ref 7–14)
APTT BLD: 28.7 SEC — SIGNIFICANT CHANGE UP (ref 27–36.3)
APTT BLD: 88.2 SEC — HIGH (ref 27–36.3)
AST SERPL-CCNC: 23 U/L — SIGNIFICANT CHANGE UP (ref 4–40)
BASE EXCESS BLDV CALC-SCNC: -6.7 MMOL/L — LOW (ref -2–3)
BASOPHILS # BLD AUTO: 0.02 K/UL — SIGNIFICANT CHANGE UP (ref 0–0.2)
BASOPHILS NFR BLD AUTO: 0.2 % — SIGNIFICANT CHANGE UP (ref 0–2)
BILIRUB SERPL-MCNC: 0.4 MG/DL — SIGNIFICANT CHANGE UP (ref 0.2–1.2)
BLOOD GAS VENOUS COMPREHENSIVE RESULT: SIGNIFICANT CHANGE UP
BUN SERPL-MCNC: 103 MG/DL — HIGH (ref 7–23)
CALCIUM SERPL-MCNC: 8.5 MG/DL — SIGNIFICANT CHANGE UP (ref 8.4–10.5)
CHLORIDE BLDV-SCNC: 106 MMOL/L — SIGNIFICANT CHANGE UP (ref 96–108)
CHLORIDE SERPL-SCNC: 109 MMOL/L — HIGH (ref 98–107)
CO2 BLDV-SCNC: 20.4 MMOL/L — LOW (ref 22–26)
CO2 SERPL-SCNC: 18 MMOL/L — LOW (ref 22–31)
CREAT SERPL-MCNC: 2.17 MG/DL — HIGH (ref 0.5–1.3)
CULTURE RESULTS: SIGNIFICANT CHANGE UP
EGFR: 33 ML/MIN/1.73M2 — LOW
EOSINOPHIL # BLD AUTO: 0.14 K/UL — SIGNIFICANT CHANGE UP (ref 0–0.5)
EOSINOPHIL NFR BLD AUTO: 1.7 % — SIGNIFICANT CHANGE UP (ref 0–6)
FLUAV AG NPH QL: SIGNIFICANT CHANGE UP
FLUAV AG NPH QL: SIGNIFICANT CHANGE UP
FLUBV AG NPH QL: SIGNIFICANT CHANGE UP
FLUBV AG NPH QL: SIGNIFICANT CHANGE UP
GAS PNL BLDV: 133 MMOL/L — LOW (ref 136–145)
GLUCOSE BLDV-MCNC: 94 MG/DL — SIGNIFICANT CHANGE UP (ref 70–99)
GLUCOSE SERPL-MCNC: 107 MG/DL — HIGH (ref 70–99)
HCO3 BLDV-SCNC: 19 MMOL/L — LOW (ref 22–29)
HCT VFR BLD CALC: 36.3 % — LOW (ref 39–50)
HCT VFR BLD CALC: 39 % — SIGNIFICANT CHANGE UP (ref 39–50)
HCT VFR BLDA CALC: 39 % — SIGNIFICANT CHANGE UP (ref 39–51)
HEMOLYSIS INDEX: 128 — SIGNIFICANT CHANGE UP
HEMOLYSIS INDEX: 25 — SIGNIFICANT CHANGE UP
HGB BLD CALC-MCNC: 13.1 G/DL — SIGNIFICANT CHANGE UP (ref 13–17)
HGB BLD-MCNC: 12.2 G/DL — LOW (ref 13–17)
HGB BLD-MCNC: 13 G/DL — SIGNIFICANT CHANGE UP (ref 13–17)
IANC: 5.83 K/UL — SIGNIFICANT CHANGE UP (ref 1.8–7.4)
IMM GRANULOCYTES NFR BLD AUTO: 0.5 % — SIGNIFICANT CHANGE UP (ref 0–0.9)
INR BLD: 1.14 RATIO — SIGNIFICANT CHANGE UP (ref 0.88–1.16)
LACTATE BLDV-MCNC: 0.4 MMOL/L — LOW (ref 0.5–2)
LYMPHOCYTES # BLD AUTO: 1.12 K/UL — SIGNIFICANT CHANGE UP (ref 1–3.3)
LYMPHOCYTES # BLD AUTO: 13.5 % — SIGNIFICANT CHANGE UP (ref 13–44)
MAGNESIUM SERPL-MCNC: 2.3 MG/DL — SIGNIFICANT CHANGE UP (ref 1.6–2.6)
MCHC RBC-ENTMCNC: 31.2 PG — SIGNIFICANT CHANGE UP (ref 27–34)
MCHC RBC-ENTMCNC: 31.2 PG — SIGNIFICANT CHANGE UP (ref 27–34)
MCHC RBC-ENTMCNC: 33.3 GM/DL — SIGNIFICANT CHANGE UP (ref 32–36)
MCHC RBC-ENTMCNC: 33.6 GM/DL — SIGNIFICANT CHANGE UP (ref 32–36)
MCV RBC AUTO: 92.8 FL — SIGNIFICANT CHANGE UP (ref 80–100)
MCV RBC AUTO: 93.5 FL — SIGNIFICANT CHANGE UP (ref 80–100)
MONOCYTES # BLD AUTO: 1.16 K/UL — HIGH (ref 0–0.9)
MONOCYTES NFR BLD AUTO: 14 % — SIGNIFICANT CHANGE UP (ref 2–14)
NEUTROPHILS # BLD AUTO: 5.83 K/UL — SIGNIFICANT CHANGE UP (ref 1.8–7.4)
NEUTROPHILS NFR BLD AUTO: 70.1 % — SIGNIFICANT CHANGE UP (ref 43–77)
NRBC # BLD: 0 /100 WBCS — SIGNIFICANT CHANGE UP (ref 0–0)
NRBC # BLD: 0 /100 WBCS — SIGNIFICANT CHANGE UP (ref 0–0)
NRBC # FLD: 0 K/UL — SIGNIFICANT CHANGE UP (ref 0–0)
NRBC # FLD: 0 K/UL — SIGNIFICANT CHANGE UP (ref 0–0)
NT-PROBNP SERPL-SCNC: 6824 PG/ML — HIGH
PCO2 BLDV: 39 MMHG — LOW (ref 42–55)
PH BLDV: 7.3 — LOW (ref 7.32–7.43)
PLATELET # BLD AUTO: 105 K/UL — LOW (ref 150–400)
PLATELET # BLD AUTO: 95 K/UL — LOW (ref 150–400)
PO2 BLDV: 58 MMHG — SIGNIFICANT CHANGE UP
POTASSIUM BLDV-SCNC: 4.3 MMOL/L — SIGNIFICANT CHANGE UP (ref 3.5–5.1)
POTASSIUM SERPL-MCNC: 4.6 MMOL/L — SIGNIFICANT CHANGE UP (ref 3.5–5.3)
POTASSIUM SERPL-MCNC: 5.7 MMOL/L — HIGH (ref 3.5–5.3)
POTASSIUM SERPL-MCNC: 5.9 MMOL/L — HIGH (ref 3.5–5.3)
POTASSIUM SERPL-SCNC: 4.6 MMOL/L — SIGNIFICANT CHANGE UP (ref 3.5–5.3)
POTASSIUM SERPL-SCNC: 5.7 MMOL/L — HIGH (ref 3.5–5.3)
POTASSIUM SERPL-SCNC: 5.9 MMOL/L — HIGH (ref 3.5–5.3)
PROT SERPL-MCNC: 6.4 G/DL — SIGNIFICANT CHANGE UP (ref 6–8.3)
PROTHROM AB SERPL-ACNC: 13.3 SEC — SIGNIFICANT CHANGE UP (ref 10.5–13.4)
RBC # BLD: 3.91 M/UL — LOW (ref 4.2–5.8)
RBC # BLD: 4.17 M/UL — LOW (ref 4.2–5.8)
RBC # FLD: 13.5 % — SIGNIFICANT CHANGE UP (ref 10.3–14.5)
RBC # FLD: 13.8 % — SIGNIFICANT CHANGE UP (ref 10.3–14.5)
RSV RNA NPH QL NAA+NON-PROBE: SIGNIFICANT CHANGE UP
RSV RNA NPH QL NAA+NON-PROBE: SIGNIFICANT CHANGE UP
SAO2 % BLDV: 88.9 % — SIGNIFICANT CHANGE UP
SARS-COV-2 RNA SPEC QL NAA+PROBE: DETECTED
SARS-COV-2 RNA SPEC QL NAA+PROBE: DETECTED
SODIUM SERPL-SCNC: 137 MMOL/L — SIGNIFICANT CHANGE UP (ref 135–145)
SPECIMEN SOURCE: SIGNIFICANT CHANGE UP
TROPONIN T, HIGH SENSITIVITY RESULT: 902 NG/L — CRITICAL HIGH
TROPONIN T, HIGH SENSITIVITY RESULT: 906 NG/L — CRITICAL HIGH
WBC # BLD: 5.89 K/UL — SIGNIFICANT CHANGE UP (ref 3.8–10.5)
WBC # BLD: 8.31 K/UL — SIGNIFICANT CHANGE UP (ref 3.8–10.5)
WBC # FLD AUTO: 5.89 K/UL — SIGNIFICANT CHANGE UP (ref 3.8–10.5)
WBC # FLD AUTO: 8.31 K/UL — SIGNIFICANT CHANGE UP (ref 3.8–10.5)

## 2022-09-18 PROCEDURE — 99223 1ST HOSP IP/OBS HIGH 75: CPT

## 2022-09-18 PROCEDURE — 93010 ELECTROCARDIOGRAM REPORT: CPT

## 2022-09-18 PROCEDURE — 99291 CRITICAL CARE FIRST HOUR: CPT | Mod: CS,25

## 2022-09-18 PROCEDURE — 71045 X-RAY EXAM CHEST 1 VIEW: CPT | Mod: 26

## 2022-09-18 RX ORDER — FUROSEMIDE 40 MG
80 TABLET ORAL DAILY
Refills: 0 | Status: DISCONTINUED | OUTPATIENT
Start: 2022-09-18 | End: 2022-09-18

## 2022-09-18 RX ORDER — ISOSORBIDE DINITRATE 5 MG/1
10 TABLET ORAL
Qty: 0 | Refills: 0 | DISCHARGE

## 2022-09-18 RX ORDER — TAMSULOSIN HYDROCHLORIDE 0.4 MG/1
1 CAPSULE ORAL
Qty: 0 | Refills: 0 | DISCHARGE

## 2022-09-18 RX ORDER — FUROSEMIDE 40 MG
40 TABLET ORAL
Refills: 0 | Status: DISCONTINUED | OUTPATIENT
Start: 2022-09-18 | End: 2022-09-21

## 2022-09-18 RX ORDER — ASPIRIN/CALCIUM CARB/MAGNESIUM 324 MG
81 TABLET ORAL DAILY
Refills: 0 | Status: DISCONTINUED | OUTPATIENT
Start: 2022-09-19 | End: 2022-09-23

## 2022-09-18 RX ORDER — TAMSULOSIN HYDROCHLORIDE 0.4 MG/1
0.4 CAPSULE ORAL AT BEDTIME
Refills: 0 | Status: DISCONTINUED | OUTPATIENT
Start: 2022-09-18 | End: 2022-09-23

## 2022-09-18 RX ORDER — HEPARIN SODIUM 5000 [USP'U]/ML
INJECTION INTRAVENOUS; SUBCUTANEOUS
Qty: 25000 | Refills: 0 | Status: DISCONTINUED | OUTPATIENT
Start: 2022-09-18 | End: 2022-09-19

## 2022-09-18 RX ORDER — HEPARIN SODIUM 5000 [USP'U]/ML
4000 INJECTION INTRAVENOUS; SUBCUTANEOUS EVERY 6 HOURS
Refills: 0 | Status: DISCONTINUED | OUTPATIENT
Start: 2022-09-18 | End: 2022-09-20

## 2022-09-18 RX ORDER — ASPIRIN/CALCIUM CARB/MAGNESIUM 324 MG
324 TABLET ORAL ONCE
Refills: 0 | Status: COMPLETED | OUTPATIENT
Start: 2022-09-18 | End: 2022-09-18

## 2022-09-18 RX ORDER — ZOLPIDEM TARTRATE 10 MG/1
5 TABLET ORAL AT BEDTIME
Refills: 0 | Status: DISCONTINUED | OUTPATIENT
Start: 2022-09-18 | End: 2022-09-18

## 2022-09-18 RX ORDER — LANOLIN ALCOHOL/MO/W.PET/CERES
6 CREAM (GRAM) TOPICAL AT BEDTIME
Refills: 0 | Status: DISCONTINUED | OUTPATIENT
Start: 2022-09-18 | End: 2022-09-23

## 2022-09-18 RX ORDER — HYDRALAZINE HCL 50 MG
1 TABLET ORAL
Qty: 0 | Refills: 0 | DISCHARGE

## 2022-09-18 RX ORDER — CHOLECALCIFEROL (VITAMIN D3) 125 MCG
1 CAPSULE ORAL
Qty: 0 | Refills: 0 | DISCHARGE

## 2022-09-18 RX ORDER — ATORVASTATIN CALCIUM 80 MG/1
1 TABLET, FILM COATED ORAL
Qty: 0 | Refills: 0 | DISCHARGE

## 2022-09-18 RX ORDER — HEPARIN SODIUM 5000 [USP'U]/ML
4000 INJECTION INTRAVENOUS; SUBCUTANEOUS ONCE
Refills: 0 | Status: COMPLETED | OUTPATIENT
Start: 2022-09-18 | End: 2022-09-18

## 2022-09-18 RX ORDER — EMPAGLIFLOZIN 10 MG/1
1 TABLET, FILM COATED ORAL
Qty: 0 | Refills: 0 | DISCHARGE

## 2022-09-18 RX ORDER — TICAGRELOR 90 MG/1
180 TABLET ORAL ONCE
Refills: 0 | Status: COMPLETED | OUTPATIENT
Start: 2022-09-18 | End: 2022-09-18

## 2022-09-18 RX ORDER — CHOLECALCIFEROL (VITAMIN D3) 125 MCG
1000 CAPSULE ORAL DAILY
Refills: 0 | Status: DISCONTINUED | OUTPATIENT
Start: 2022-09-18 | End: 2022-09-23

## 2022-09-18 RX ORDER — ALPRAZOLAM 0.25 MG
0.5 TABLET ORAL ONCE
Refills: 0 | Status: DISCONTINUED | OUTPATIENT
Start: 2022-09-18 | End: 2022-09-18

## 2022-09-18 RX ORDER — HYDRALAZINE HCL 50 MG
50 TABLET ORAL THREE TIMES A DAY
Refills: 0 | Status: DISCONTINUED | OUTPATIENT
Start: 2022-09-18 | End: 2022-09-23

## 2022-09-18 RX ORDER — ATORVASTATIN CALCIUM 80 MG/1
80 TABLET, FILM COATED ORAL AT BEDTIME
Refills: 0 | Status: DISCONTINUED | OUTPATIENT
Start: 2022-09-18 | End: 2022-09-23

## 2022-09-18 RX ORDER — ASPIRIN/CALCIUM CARB/MAGNESIUM 324 MG
1 TABLET ORAL
Qty: 0 | Refills: 0 | DISCHARGE

## 2022-09-18 RX ORDER — FUROSEMIDE 40 MG
80 TABLET ORAL ONCE
Refills: 0 | Status: DISCONTINUED | OUTPATIENT
Start: 2022-09-18 | End: 2022-09-21

## 2022-09-18 RX ADMIN — TICAGRELOR 180 MILLIGRAM(S): 90 TABLET ORAL at 18:43

## 2022-09-18 RX ADMIN — ATORVASTATIN CALCIUM 80 MILLIGRAM(S): 80 TABLET, FILM COATED ORAL at 21:13

## 2022-09-18 RX ADMIN — HEPARIN SODIUM 1000 UNIT(S)/HR: 5000 INJECTION INTRAVENOUS; SUBCUTANEOUS at 14:40

## 2022-09-18 RX ADMIN — TAMSULOSIN HYDROCHLORIDE 0.4 MILLIGRAM(S): 0.4 CAPSULE ORAL at 21:13

## 2022-09-18 RX ADMIN — Medication 324 MILLIGRAM(S): at 14:40

## 2022-09-18 RX ADMIN — Medication 50 MILLIGRAM(S): at 21:13

## 2022-09-18 RX ADMIN — Medication 0.5 MILLIGRAM(S): at 21:13

## 2022-09-18 RX ADMIN — HEPARIN SODIUM 4000 UNIT(S): 5000 INJECTION INTRAVENOUS; SUBCUTANEOUS at 14:31

## 2022-09-18 NOTE — ED PROVIDER NOTE - OBJECTIVE STATEMENT
Reyes Bell, PGY-3- 65 yo M PMHx of CKD stage 3, heart failure, HTN, signed out AMA last night, woke up this morning with worsening SOB. Pt seen in ED last night and was found to have troponins >1000. Was being worked up for NSTEMI vs myocarditis. Presents with 10 days of weakness and leg swelling. Was admitted at [facility] for covid on bipap, d/whit 10 days ago. Since then patient has had subjective fever, cough with yellow sputum, weakness, increased bilateral leg swelling. Endorses mild SOB. Says he has gained 8 lbs since discharge. Has missed some of his diuretic doses at home. Also endorses urinary retention and that he had retention on his last hospital stay. No CP, nausea, vomiting, abd pain. Reyes Bell, PGY-3- 67 yo M PMHx of CKD stage 3, heart failure, HTN, signed out AMA last night, woke up this morning with worsening SOB. Pt seen in ED last night and was found to have troponins >1000. Was being worked up for NSTEMI vs myocarditis. Presents with 10 days of weakness and leg swelling. Was admitted at [facility] for covid on bipap, d/whit 10 days ago. Since then patient has had subjective fever, cough with yellow sputum, weakness, increased bilateral leg swelling. Endorses mild SOB. Says he has gained 8 lbs since discharge. Has missed some of his diuretic doses at home. Also endorses urinary retention and that he had retention on his last hospital stay. No CP, nausea, vomiting, abd pain.    Attendinyo male presents with shortness of breath and dyspnea with exertion.  no fever or chills.  had covid about 2 weeks ago.  no vomiting.  was supposed to be admitted for nstemi yesterday but pt left ama because he did not want to wait for a bed

## 2022-09-18 NOTE — ED PROVIDER NOTE - CLINICAL SUMMARY MEDICAL DECISION MAKING FREE TEXT BOX
Reyes Anderson, PGY-3- 66 year old male with a pmhx of CABG 15 years ago, CHF, on Entresto, Lasix, Plavix, ASA, here for worsening SOB. Pt signed out AMA yesterday. had troponin >1000. Concern for NSTEMI vs COVID myocarditis. Left without completed work up. Vitals stable on arrival. Not requiring O2. Does not appear overtly fluid overloaded. Will r/o ACS, chf exacerbation, PE. Recently hospitalized with COVID, at higher risk of clot. Cards eval, tele admission.

## 2022-09-18 NOTE — H&P ADULT - PROBLEM SELECTOR PLAN 6
-c/w home ASA  -loaded with brillinta earlier so plavix on hold  -c/w atorvastatin 80  -check a1c, lipids, tsh -c/w home ASA  -loaded with brillinta earlier so Plavix on hold  -c/w atorvastatin 80  -check a1c, lipids, tsh  -Plan as above for NSTEMI

## 2022-09-18 NOTE — H&P ADULT - NSHPPHYSICALEXAM_GEN_ALL_CORE
Vital Signs Last 24 Hrs  T(C): 37.1 (18 Sep 2022 19:15), Max: 37.1 (18 Sep 2022 19:15)  T(F): 98.8 (18 Sep 2022 19:15), Max: 98.8 (18 Sep 2022 19:15)  HR: 64 (18 Sep 2022 19:15) (62 - 70)  BP: 149/62 (18 Sep 2022 19:15) (126/54 - 156/82)  BP(mean): --  RR: 16 (18 Sep 2022 19:15) (16 - 19)  SpO2: 99% (18 Sep 2022 19:15) (96% - 99%)    Parameters below as of 18 Sep 2022 19:15  Patient On (Oxygen Delivery Method): room air        CONSTITUTIONAL: NAD, well-developed, well-groomed  EYES: PERRLA; conjunctiva and sclera clear  ENMT: Moist oral mucosa, no pharyngeal injection or exudates; normal dentition  NECK: Supple, no palpable masses; no thyromegaly  RESPIRATORY: Poor air movement in lung bases, +rhonchi from mid-lung to bases bilaterally  CARDIOVASCULAR: Regular rate and rhythm, normal S1 and S2, +soft systolic murmur; 1+ LE edema; Peripheral pulses are 2+ bilaterally  ABDOMEN: Nontender to palpation, normoactive bowel sounds, no rebound/guarding; No hepatosplenomegaly  MUSCULOSKELETAL:  Normal gait; no clubbing or cyanosis of digits; no joint swelling or tenderness to palpation  PSYCH: A+O to person, place, and time; affect appropriate  NEUROLOGY: CN 2-12 are intact and symmetric; no gross sensory deficits   SKIN: No rashes; no palpable lesions Vital Signs Last 24 Hrs  T(C): 37.1 (18 Sep 2022 19:15), Max: 37.1 (18 Sep 2022 19:15)  T(F): 98.8 (18 Sep 2022 19:15), Max: 98.8 (18 Sep 2022 19:15)  HR: 64 (18 Sep 2022 19:15) (62 - 70)  BP: 149/62 (18 Sep 2022 19:15) (126/54 - 156/82)  BP(mean): --  RR: 16 (18 Sep 2022 19:15) (16 - 19)  SpO2: 99% (18 Sep 2022 19:15) (96% - 99%)    Parameters below as of 18 Sep 2022 19:15  Patient On (Oxygen Delivery Method): room air        CONSTITUTIONAL: NAD, well-developed, well-groomed  EYES: PERRLA; conjunctiva and sclera clear  ENMT: Moist oral mucosa, no pharyngeal injection or exudates; normal dentition  NECK: Supple, no palpable masses; no thyromegaly  RESPIRATORY: Poor air movement in lung bases, +rales at bases bilaterally  CARDIOVASCULAR: Regular rate and rhythm, normal S1 and S2, +soft systolic murmur; 1+ LE edema; Peripheral pulses are 2+ bilaterally  ABDOMEN: Nontender to palpation, normoactive bowel sounds, no rebound/guarding; No hepatosplenomegaly  MUSCULOSKELETAL:  Normal gait; no clubbing or cyanosis of digits; no joint swelling or tenderness to palpation  PSYCH: A+O to person, place, and time; affect appropriate  NEUROLOGY: CN 2-12 are intact and symmetric; no gross sensory deficits   SKIN: No rashes; no palpable lesions    additional elements of ROS below

## 2022-09-18 NOTE — H&P ADULT - PROBLEM SELECTOR PLAN 2
-troponinemia 1000-->900s  -EKG with TWIs in lateral leads  -pt has history of CAD s/p CABG, cardiac risk factors, possibly ischemic injury leading to CHF exacerbation  -s/p ASA, brillinta load, hep bolus    Plan:  -trend EKGs and cardiac enzymes  -check TTE  -c/w heparin gtt, ASA  -f/u cardiology for nonurgent [facility] -suspect combined systolic and diastolic CHF but no prior echo to compare  -pt follows at [facility] with Dr. Messina, will need to obtain collateral, prior echo, etc.  -s/p lasix 80mg x1 in ED, still currently appears volume overloaded. BP in 140s    Plan:  -start lasix IV 40mg BID, please re-evaluate volume status daily  [ ] check TTE  -cardiology following, appreciate recs  -c/w home hydralazine 50 TID  -hold home metoprolol tartrate 50 BID given sinus bradycardia  -hold home entresto given ALTAGRACIA, hold eplerenone given hyperK, hold isordil given softer pressures  -monitor strict I/Os, daily weights ProBN 6.8K; CXR c/w vascular congestion/ mild pulm edema; Suspect combined rEF and diastolic acute on chronic CHF but no prior 2D echo results on EMR to compare;  -pt follows at [facility] with Dr. Messina, will need to obtain collateral, prior echo, etc.  -s/p lasix 80mg x1 in ED, still currently appears volume overloaded. BP in 140s    Plan:  -start lasix IV 40mg BID, please re-evaluate volume status daily  [ ] check TTE  -cardiology following, appreciate recs  -c/w home hydralazine 50 TID  -hold home metoprolol tartrate 50 BID given sinus bradycardia  -hold home entresto given ALTAGRACIA, hold eplerenone given hyperK, hold isordil given softer pressures  -monitor strict I/Os, daily weights

## 2022-09-18 NOTE — CONSULT NOTE ADULT - ASSESSMENT
Patient is a 67yo M with PMHx of HTN, HLD, DM, CABG 14 years ago, Gout, recently discharged from [facility] 5 days ago for COVID infection who presents to the ED with complaints of productive cough, fatigue, LE edema. Patient was in the ER last night and went home AMA because he did not want to wait for a bed. He presented back to the ER today with worsening shortness of breath and a weight gain of 10 pound in the last 3 weeks. In the ER, his troponin last night was 1100 and is now downtrending to 900. Serum pro BNP is elevated to 6824.  Cardiology consulted for NSTEMI/ possible late-presentation MI and acute heart failure exacerbation. Patient states he has been partially compliant with medications since discharge from [facility] but overall has not been feeling well. Currently denies headaches, dizziness, chest pain, palpitations, KANG, shortness of breath, abdominal pain, N/V. Vitals obtained at the bedside WNL at this time. EKG obtained without JR or depressions concerning for active ischemic event. Patient reports he follows with Cardiology at [facility], had cardiac cath 1 year ago which was "normal".       IMPRESSION: Demand ischemia in setting of acute heart failure exacerbation vs. post-covid myocarditis  PLAN:  Admit to telemetry for continuous monitoring    VOLUME STATUS:  Appears volume overloaded on exam  lasix 80mg IVP x 1  initiate lasix 40mg IV BID  ECHO in am  -treat for acute heart failure exacerbation    ACUTE HF exacerbation:  -patient reports he follows with Dr. Messina at [facility], please obtain most recent work up  -he reports he is up 10 pounds in weight  -he has SOB and PND that wakes him up at night  -he is volume overloaded on exam, holding most of the edema in his gut  -he is on room air oxygenating well  -would hold ARB/ACE for ALTAGRACIA  -initiate hydral 25 tid for elevated BP  -hold osiris for elevated K  -obtain ECHO then will need GDMT, consider adding low dose coreg   -daily weight  -strict intake and output      NSTEMI: Troponin elevation of 900 with elevated creatinine of 2.12 and EKG 12 lead: with TWI in V5 and V6:  -treat for ACS with heparin gtt  -re-load with brilinta 180 today  continue to aspirin 81mg po qd  -lipitor 80  -EKG reviewed, normal sinus rhtyhm with TWI in V5 and V6, unlikely acute plaque rupture  -will likely need non-emergent LHC, has elevated troponin  -would further optimize prior to cath  -serial cardiac enzymes with 12 lead EKG every 8 hours    COVID INFECTION and Myocarditis  -no fever, no leukocytosis  -patient recieved regeneron at outside hospital and reports he has no felt well since  -CXR reveals some pulmonary congestion  -elevated troponins can reflect some myocardial injury, will need further testing to determine    This consult has been reviewed by cardiology fellow, Dr. Rosenbaum. Cardiology will continue to follow

## 2022-09-18 NOTE — ED ADULT NURSE NOTE - CHIEF COMPLAINT QUOTE
Pt AOX4  c/o SOB couldn't sleep last night; signed out AMA last night because they "kept him in that room" for 4 hours; was supposed to be admitted for Dx of heart failure; pt had recent 1 week stay at [facility] for [facility] where they told him to f/u for possible eval of HF; pt has Hx of kidney failure  says his nephrologist's name is "Dr. Sancho Nunez."

## 2022-09-18 NOTE — CONSULT NOTE ADULT - NS ATTEND AMEND GEN_ALL_CORE FT
troponin elevation in the  setting of acute CHF exacerbation. JVP elevated with LE edema.  doubt ACS and can stop hep gtt    feels better with diuresis and would increase furosemide to 80mg IV BID  plan to optimize GDMT  f/u TTE  obtain prior [facility] records

## 2022-09-18 NOTE — ED ADULT NURSE NOTE - OBJECTIVE STATEMENT
Pt received to room 6 AAO x 4 and ambulatory, seen in ED last night and was found to have troponins >1000. Was being worked up for NSTEMI vs myocarditis. Pt c/o 10 days of weakness and leg swelling. Was admitted at [facility] for covid on bipap, d/whit 10 days ago. Since then patient has had subjective fever, cough with yellow sputum, weakness, increased bilateral leg swelling. Endorses mild SOB. Says he has gained 8 lbs since discharge. Has missed some of his diuretic doses at home. Labs sent, 18G placed to the left AC and NSR noted on tele monitor. Eval in progress and pt breathing with ease on room air.

## 2022-09-18 NOTE — H&P ADULT - NSHPREVIEWOFSYSTEMS_GEN_ALL_CORE
In additional the that documented in the HPI, the additional ROS was obtained:    CONSTITUTIONAL: No fever, no chills  EYES: no change in vision, no blurred vision  HEENT: no trouble swallowing, no trouble speaking, no sore throat  CV: no chest pain, no palpitations  RESP: +cough and SOB  GI: no abdominal pain, no nausea, no vomiting, no diarrhea, no constipation  : No dysuria, no frequency  NEURO: no headache, no new numbness, no weakness, no dizziness  SKIN: no new rashes  HEME: No easy bruising or bleeding  MSK: no recent trauma In additional the that documented in the HPI, the additional ROS was obtained:    CONSTITUTIONAL: No fever, no chills  EYES: no change in vision, no blurred vision  HEENT: no trouble swallowing, no trouble speaking, no sore throat  CV: no chest pain, no palpitations, + leg edema  RESP: +cough and SOB  GI: no abdominal pain, no nausea, no vomiting, no diarrhea, no constipation  : No dysuria, no frequency  NEURO: no headache, no new numbness, no weakness, no dizziness  SKIN: no new rashes  HEME: No easy bruising or bleeding  MSK: no recent trauma    additional elements of ROS below

## 2022-09-18 NOTE — ED PROVIDER NOTE - CARE PLAN
1 Principal Discharge DX:	NSTEMI (non-ST elevation myocardial infarction)  Secondary Diagnosis:	SOB (shortness of breath)

## 2022-09-18 NOTE — H&P ADULT - PROBLEM SELECTOR PLAN 3
-Cr 2.17 on admission, decreased compared to yesterday, unclear baseline.  - Etiology possibly prerenal iso CHF exacerbation, cardiogenic etiology BUN/Cr>20  -hold colchicine iso ALTAGRACIA  -monitor I/Os  -c/w flomax given history of BPH  -check bladder scan x1 to r/o urinary retention  -check urine lytes -Cr 2.17 on admission, decreased compared to yesterday, unclear baseline.  -CKD likely hypertensive nephropathy given pt on 3 antihypertensives  - Etiology possibly prerenal iso CHF exacerbation, cardiogenic etiology BUN/Cr>20  -hold colchicine iso ALTAGRACIA  -monitor I/Os  -c/w flomax given history of BPH  -check bladder scan x1 to r/o urinary retention  -check urine lytes Likely acute on chronic given low serum bicarb=18; Cr 2.17 on admission, decreased compared to yesterday, unclear baseline. Likely due to acute CHF;   -CKD likely hypertensive nephropathy given pt on 3 antihypertensives  -Etiology possibly prerenal iso CHF exacerbation, cardiogenic etiology BUN/Cr>20  -Monitor for RF improvement with diuresis   -hold colchicine iso ALTAGRACIA  -monitor I/Os  -c/w flomax given history of BPH  -check bladder scan x1 to r/o urinary retention  -check urine lytes

## 2022-09-18 NOTE — H&P ADULT - PROBLEM SELECTOR PLAN 8
DVT ppx: therapeutic on heparin gtt  Diet: DASH renal  Dispo: pending clinical improvement DVT ppx: therapeutic on heparin gtt acs  Diet: DASH renal  Dispo: pending clinical improvement

## 2022-09-18 NOTE — CHART NOTE - NSCHARTNOTEFT_GEN_A_CORE
This report was requested by: Zeinab Thomas | Reference #: [id number]    Patient Name: Kailash Naranjo YOB: 1955  Address: [address] [place] [id number]: Male  Rx Written	Rx Dispensed	Drug	Quantity	Days Supply	Prescriber Name	Prescriber Nadege #	Payment Method	Dispenser  12/14/2021	12/14/2021	acetaminophen-cod #3 tablet	16	4	Mark Colvin DDS	[id number]	[facility]

## 2022-09-18 NOTE — ED PROVIDER NOTE - PHYSICAL EXAMINATION
General: well appearing, no acute distress, AOx3  Skin: no rash, no pallor  Head: normocephalic, atraumatic  Eyes: clear conjunctiva, EOMI  ENMT: airway patent, no nasal discharge  Cardiovascular: normal rate, normal rhythm, S1/S2, b/l 2+ pitting edema to ankles, no calf tenderness   Pulmonary: rhonchi at bases, no wheeze   Abdomen: soft, nontender  Musculoskeletal: moving extremities well, no deformity  Psych: normal mood, normal affect

## 2022-09-18 NOTE — H&P ADULT - NSHPLABSRESULTS_GEN_ALL_CORE
13.0   8.31  )-----------( 95       ( 18 Sep 2022 12:45 )             39.0           x   |  x   |  x   ----------------------------<  x   4.6   |  x   |  x     Ca    8.5      18 Sep 2022 12:45  Mg     2.30         TPro  6.4  /  Alb  3.4  /  TBili  0.4  /  DBili  x   /  AST  23  /  ALT  19  /  AlkPhos  40                Urinalysis Basic - ( 17 Sep 2022 21:30 )    Color: Yellow / Appearance: Clear / S.023 / pH: x  Gluc: x / Ketone: Negative  / Bili: Negative / Urobili: <2 mg/dL   Blood: x / Protein: Trace / Nitrite: Negative   Leuk Esterase: Negative / RBC: x / WBC x   Sq Epi: x / Non Sq Epi: x / Bacteria: x        PT/INR - ( 18 Sep 2022 12:45 )   PT: 13.3 sec;   INR: 1.14 ratio         PTT - ( 18 Sep 2022 12:45 )  PTT:28.7 sec    Lactate Trend      CARDIAC MARKERS ( 18 Sep 2022 14:35 )  x     / x     / 94 U/L / x     / 3.9 ng/mL  CARDIAC MARKERS ( 17 Sep 2022 21:40 )  x     / x     / 78 U/L / x     / x            CAPILLARY BLOOD GLUCOSE                  Cultures:    Radiology:< from: Xray Chest 1 View- PORTABLE-Urgent (22 @ 13:49) >    Decreased pulmonary congestion.  No acute radiologic cardiopulmonary findings.    < end of copied text >        EKG:   HR 59, sinus, TWIs in lateral leads similar to  EKG, QTc 411 .    -personally interpreted EKG: sinus bradycardia, 59bpm, qtc 411, 1st deg AVB, Tw flattening in I, aVL, II, III, aVF, and inversion in  V5-V6; no PACs or PVCs    -personally interpreted LABs:    13.0   8.31  )-----------( 95       ( 18 Sep 2022 12:45 )             39.0           x   |  x   |  x   ----------------------------<  x   4.6   |  x   |  x     Ca    8.5      18 Sep 2022 12:45  Mg     2.30         TPro  6.4  /  Alb  3.4  /  TBili  0.4  /  DBili  x   /  AST  23  /  ALT  19  /  AlkPhos  40          Urinalysis Basic - ( 17 Sep 2022 21:30 )    Color: Yellow / Appearance: Clear / S.023 / pH: x  Gluc: x / Ketone: Negative  / Bili: Negative / Urobili: <2 mg/dL   Blood: x / Protein: Trace / Nitrite: Negative   Leuk Esterase: Negative / RBC: x / WBC x   Sq Epi: x / Non Sq Epi: x / Bacteria: x    PT/INR - ( 18 Sep 2022 12:45 )   PT: 13.3 sec;   INR: 1.14 ratio       PTT - ( 18 Sep 2022 12:45 )  PTT:28.7 sec    CARDIAC MARKERS ( 18 Sep 2022 14:35 )  x     / x     / 94 U/L / x     / 3.9 ng/mL  CARDIAC MARKERS ( 17 Sep 2022 21:40 )  x     / x     / 78 U/L / x     / x        -personally interpreted CXR: vascular congestion, no consolidations, s/p sternotomy, + sternal wires and med. clips, no pleural effusions

## 2022-09-18 NOTE — H&P ADULT - ATTENDING COMMENTS
Assessment and plan supplemented and modified where indicated; 65yo Male, current smoker, with h/o CKD3, CHF, CAD s/p CABG (2007), HTN, pre-DM, current smoker, recent COVID-19 (tested positive 9/8) c/o  a/w NSTEMI and acute on chronic likely HFrEF; Found to have likely ALTAGRACIA on CKD;    Assessment and plan supplemented and modified where indicated;

## 2022-09-18 NOTE — H&P ADULT - NSHPSOCIALHISTORY_GEN_ALL_CORE
Lives at home, walks and eats independently.   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

## 2022-09-18 NOTE — ED POST DISCHARGE NOTE - RESULT SUMMARY
CXR : No evidence of CHF. Lower RT hazy opacification may indicate in the proper clinical setting . Follow up study is recommended. Patient contact # [phone] discussed with patient CXR results. Patient claims still not feeling well and wishes to return to ED. Discussed with patient if not improving he should return to ED.

## 2022-09-18 NOTE — H&P ADULT - PROBLEM SELECTOR PLAN 5
-pt tested positive 9/8 and received 5d remdesivir/dexamethasone  -pt is satting well without supplemental oxygen needs  -monitor off COVID treatment  -check d-dimer, ldh, crp/esr -possible covid myocarditis  -pt tested positive 9/8 and received 5d remdesivir/dexamethasone  -pt is satting well without supplemental oxygen needs  -monitor off COVID treatment  -check d-dimer, ldh, crp/esr r/o covid myocarditis  -pt tested positive 9/8 and received 5d remdesivir/dexamethasone  -pt is satting well without supplemental oxygen needs  -monitor off COVID treatment  -check d-dimer, ldh, crp/esr

## 2022-09-18 NOTE — H&P ADULT - PROBLEM SELECTOR PLAN 4
-Plt 95  -check blue top platelet count to rule out clumping  -trend CBC -Plt 95K  -check blue top platelet count to rule out clumping  -trend CBC

## 2022-09-18 NOTE — CONSULT NOTE ADULT - SUBJECTIVE AND OBJECTIVE BOX
HPI: Patient is a 67yo M with PMHx of HTN, HLD, DM, CABG 14 years ago, Gout, recently discharged from [facility] 5 days ago for COVID infection who presents to the ED with complaints of productive cough, fatigue, LE edema. In the ED, labs significant for elevated troponin (1100) and elevated pro-BNP. Cardiology consulted patient seen and examined at the bedside. States he has been partially compliant with medications since discharge from [facility] but overall has not been feeling well. Currently denies headaches, dizziness, chest pain, palpitations, KANG, shortness of breath, abdominal pain, N/V. Vitals obtained at the bedside WNL at this time. EKG obtained without JR or depressions concerning for active ischemic event. Patient reports he follows with Cardiology at [facility], had cardiac cath 1 year ago which was "normal".       Allergies  No Known Allergies    MEDICATIONS  (STANDING):    MEDICATIONS  (PRN):      Daily     Daily     Drug Dosing Weight    Weight (kg): 103.4 (17 Sep 2022 20:32)    PAST MEDICAL & SURGICAL HISTORY:  Heart failure  Stage 3 chronic kidney disease  HTN (hypertension)    FAMILY HISTORY: Denies significant PMH    SOCIAL HISTORY: Denies smoking, ETOH, illicit drug use     REVIEW OF SYSTEMS:    CONSTITUTIONAL: No fever, weight loss, or fatigue  EYES: No eye pain, visual disturbances, or discharge  ENMT:  No difficulty hearing, tinnitus, vertigo; No sinus or throat pain  NECK: No pain or stiffness  BREASTS: No pain, masses, or nipple discharge  RESPIRATORY: No cough, wheezing, chills or hemoptysis; No shortness of breath  CARDIOVASCULAR: No chest pain, palpitations, dizziness, or leg swelling  GASTROINTESTINAL: No abdominal or epigastric pain. No nausea, vomiting, or hematemesis; No diarrhea or constipation. No melena or hematochezia.  GENITOURINARY: No dysuria, frequency, hematuria, or incontinence  NEUROLOGICAL: No headaches, memory loss, loss of strength, numbness, or tremors  SKIN: No itching, burning, rashes, or lesions   LYMPH NODES: No enlarged glands  ENDOCRINE: No heat or cold intolerance; No hair loss  MUSCULOSKELETAL: No joint pain or swelling; No muscle, back, or extremity pain    ICU Vital Signs Last 24 Hrs  T(C): 36.7 (17 Sep 2022 19:10), Max: 36.7 (17 Sep 2022 19:10)  T(F): 98 (17 Sep 2022 19:10), Max: 98 (17 Sep 2022 19:10)  HR: 63 (17 Sep 2022 20:32) (63 - 67)  BP: 147/73 (17 Sep 2022 20:32) (147/73 - 159/65)  RR: 16 (17 Sep 2022 20:32) (16 - 17)  SpO2: 98% (17 Sep 2022 20:32) (98% - 99%)    O2 Parameters below as of 17 Sep 2022 20:32  Patient On (Oxygen Delivery Method): room air    PHYSICAL EXAM:    GENERAL: NAD, well-groomed, well-developed  HEAD:  Atraumatic, Normocephalic  EYES: EOMI, PERRLA, conjunctiva and sclera clear  ENMT: No tonsillar erythema, exudates, or enlargement; Moist mucous membranes, Good dentition, No lesions  NECK: Supple, No JVD, Normal thyroid  NERVOUS SYSTEM:  Alert & Oriented X3, Good concentration; Motor Strength 5/5 B/L upper and lower extremities; DTRs 2+ intact and symmetric  CHEST/LUNG: Clear to percussion bilaterally; No rales, rhonchi, wheezing, or rubs  HEART: Regular rate and rhythm; No murmurs, rubs, or gallops  ABDOMEN: Soft, Nontender, Nondistended; Bowel sounds present  EXTREMITIES:  1+ bilateral LE edema2+ Peripheral Pulses, No clubbing, cyanosis    LABS:  CBC Full  -  ( 17 Sep 2022 20:26 )  WBC Count : 9.82 K/uL  RBC Count : 4.29 M/uL  Hemoglobin : 13.4 g/dL  Hematocrit : 40.1 %  Platelet Count - Automated : 104 K/uL  Mean Cell Volume : 93.5 fL  Mean Cell Hemoglobin : 31.2 pg  Mean Cell Hemoglobin Concentration : 33.4 gm/dL  Auto Neutrophil # : 7.60 K/uL  Auto Lymphocyte # : 0.92 K/uL  Auto Monocyte # : 1.14 K/uL  Auto Eosinophil # : 0.08 K/uL  Auto Basophil # : 0.02 K/uL  Auto Neutrophil % : 77.4 %  Auto Lymphocyte % : 9.4 %  Auto Monocyte % : 11.6 %  Auto Eosinophil % : 0.8 %  Auto Basophil % : 0.2 %    09-17    138  |  107  |  117<H>  ----------------------------<  165<H>  4.5   |  18<L>  |  2.40<H>    Ca    8.6      17 Sep 2022 20:26    TPro  6.3  /  Alb  3.6  /  TBili  0.4  /  DBili  x   /  AST  17  /  ALT  18  /  AlkPhos  44  09-17    CAPILLARY BLOOD GLUCOSE                  
Date of Admission:  9/18/22  CHIEF COMPLAINT:  shortness of breath  HISTORY OF PRESENT ILLNESS:  Patient is a 65yo M with PMHx of HTN, HLD, DM, CABG 14 years ago, Gout, recently discharged from [facility] 5 days ago for COVID infection who presents to the ED with complaints of productive cough, fatigue, LE edema. Patient was in the ER last night and went home AMA because he did not want to wait for a bed. He presented back to the ER today with worsening shortness of breath and a weight gain of 10 pound in the last 3 weeks. In the ER, his troponin last night was 1100 and is now downtrending to 900. Serum pro BNP is elevated to 6824.  Cardiology consulted for NSTEMI/ possible late-presentation MI and acute heart failure exacerbation. Patient states he has been partially compliant with medications since discharge from [facility] but overall has not been feeling well. Currently denies headaches, dizziness, chest pain, palpitations, KANG, shortness of breath, abdominal pain, N/V. Vitals obtained at the bedside WNL at this time. EKG obtained without JR or depressions concerning for active ischemic event. Patient reports he follows with Cardiology at [facility], had cardiac cath 1 year ago which was "normal".       NKDA	    MEDICATIONS:  furosemide   Injectable 80 milliGRAM(s) IV Push daily  heparin   Injectable 5000 Unit(s) IV Push once  heparin   Injectable 6000 Unit(s) IV Push every 6 hours PRN  heparin  Infusion.  Unit(s)/Hr IV Continuous <Continuous>  zolpidem 5 milliGRAM(s) Oral at bedtime PRN    PAST MEDICAL & SURGICAL HISTORY:  Heart failure  Stage 3 chronic kidney disease  HTN (hypertension)    FAMILY HISTORY:    SOCIAL HISTORY:  Daughter is an NP, involved in the father's care    REVIEW OF SYSTEMS:  See HPI. Otherwise, 10 point ROS done and otherwise negative.    T(C): 36.3 (09-18-22 @ 15:09), Max: 36.8 (09-18-22 @ 00:06)  HR: 64 (09-18-22 @ 15:09) (62 - 70)  BP: 126/64 (09-18-22 @ 15:09) (126/54 - 159/65)  RR: 19 (09-18-22 @ 15:09) (16 - 19)  SpO2: 98% (09-18-22 @ 15:09) (96% - 99%)  Wt(kg): --  I&O's Summary      Physical Exam:  General: NAD  Cardiovascular: Normal S1 S2, elevated JVD, No murmurs, No edema  Respiratory: Lungs with bibasilar crackles  Gastrointestinal:  softly distended  Skin: warm and dry, No rashes, No ecchymoses, No cyanosis	  Extremities:  No clubbing, cyanosis or edema  Vascular: Peripheral pulses palpable 2+ bilaterally    LABS:	   	    CBC Full  -  ( 18 Sep 2022 12:45 )  WBC Count : 8.31 K/uL  Hemoglobin : 13.0 g/dL  Hematocrit : 39.0 %  Platelet Count - Automated : 95 K/uL  Mean Cell Volume : 93.5 fL  Mean Cell Hemoglobin : 31.2 pg  Mean Cell Hemoglobin Concentration : 33.3 gm/dL  Auto Neutrophil # : 5.83 K/uL  Auto Lymphocyte # : 1.12 K/uL  Auto Monocyte # : 1.16 K/uL  Auto Eosinophil # : 0.14 K/uL  Auto Basophil # : 0.02 K/uL  Auto Neutrophil % : 70.1 %  Auto Lymphocyte % : 13.5 %  Auto Monocyte % : 14.0 %  Auto Eosinophil % : 1.7 %  Auto Basophil % : 0.2 %    09-18    x   |  x   |  x   ----------------------------<  x   5.7<H>   |  x   |  x   09-18    137  |  109<H>  |  103<H>  ----------------------------<  107<H>  5.9<H>   |  18<L>  |  2.17<H>    Ca    8.5      18 Sep 2022 12:45  Ca    8.6      17 Sep 2022 20:26  Mg     2.30     09-18    TPro  6.4  /  Alb  3.4  /  TBili  0.4  /  DBili  x   /  AST  23  /  ALT  19  /  AlkPhos  40  09-18  TPro  6.3  /  Alb  3.6  /  TBili  0.4  /  DBili  x   /  AST  17  /  ALT  18  /  AlkPhos  44  09-17      proBNP: Serum Pro-Brain Natriuretic Peptide: 6824 pg/mL (09-18 @ 12:45)  Serum Pro-Brain Natriuretic Peptide: 6349 pg/mL (09-17 @ 20:26)

## 2022-09-18 NOTE — H&P ADULT - PROBLEM SELECTOR PLAN 1
-pt follows at [facility] with Dr. Messina, will need to obtain collateral, prior echo, etc.  -pt's acute SOB improved after giving IV lasix, so suspect acute SOB 2/2 NSTEMI possibly leading to CHF exacerbation.  -less likely covid myocarditis given acuity of sxs.  -less likely PE, Well's score is 0  -s/p lasix 80mg x1 in ED, still currently appears volume overloaded. BP in 140s    Plan:  -start lasix IV 40mg BID  [ ] check TTE  -cardiology following, appreciate recs  -c/w home hydralazine 50 TID  -hold home metoprolol tartrate 50 BID given sinus bradycardia  -hold home entresto given ALTAGRACIA, hold eplerenone given hyperK, hold isordil given softer pressures  -monitor strict I/Os, daily weights -pt follows at [facility] with Dr. Messina, will need to obtain collateral, prior echo, etc.  -pt's acute SOB improved after giving IV lasix, so suspect acute SOB 2/2 NSTEMI possibly leading to CHF exacerbation.  -less likely covid myocarditis given acuity of sxs.  -less likely PE, Well's score is 0  -no leukocytosis, fever, or other infectious sxs suggesting acute infection besides resolving covid.  -s/p lasix 80mg x1 in ED, still currently appears volume overloaded. BP in 140s    Plan:  -start lasix IV 40mg BID  [ ] check TTE  -cardiology following, appreciate recs  -c/w home hydralazine 50 TID  -hold home metoprolol tartrate 50 BID given sinus bradycardia  -hold home entresto given ALTAGRACIA, hold eplerenone given hyperK, hold isordil given softer pressures  -monitor strict I/Os, daily weights -pt's acute SOB improved after giving IV lasix, so suspect acute SOB 2/2 NSTEMI possibly leading to CHF exacerbation since pt has history of CAD and CABG, active smoker with cardiac risk factors  -less likely covid myocarditis given acuity of sxs.  -less likely PE, Well's score is 0  -no leukocytosis, fever, or other infectious sxs suggesting acute infection besides resolving covid.  -troponinemia 1000-->900s  -EKG with TWIs in lateral leads  -pt has history of CAD s/p CABG, cardiac risk factors, possibly ischemic injury leading to CHF exacerbation  -s/p ASA, brillinta load, hep bolus    Plan:  -trend EKGs and cardiac enzymes  -check TTE  -c/w heparin gtt, ASA  -f/u cardiology for nonurgent C Acute SOB improved after giving IV lasix, so suspect acute SOB 2/2 NSTEMI possibly leading to CHF exacerbation since pt has history of CAD and CABG, active smoker with cardiac risk factors; r/o covid myocarditis;  Troponin plateaued 906-->902 (24 hrs ago Trop was 1073)  -less likely PE, Well's score is 0  -EKG with TWIs in lateral leads, no ST changes  -pt has history of CAD s/p CABG, cardiac risk factors, possibly recent ischemic injury leading to CHF exacerbation  -s/p ASA, brillinta load  -started on hep gtt ACS  Plan:  -trend EKGs and cardiac enzymes  -check TTE  -c/w heparin gtt, ASA  -f/u cardiology for planned C

## 2022-09-18 NOTE — H&P ADULT - HISTORY OF PRESENT ILLNESS
66M with h/o CKD3, CHF, CAD s/p CABG (2007), HTN, pre-DM, current smoker, recent COVID (tested positive 9/8) presenting with SOB. Pt presented here with similar sxs yesterday and was a planned admission for NSTEMI and COVID exacerbation, given 80mg IV lasix, however pt AMA'd while awaiting a bed. Today pt states he had worsening SOB so represented. Pt states that his breathing has not felt the same since his recent admission to [facility] for COVID 10 days ago, was treated on bipap, remdesivir/dexamethasone x5d. He endorses productive cough with whitish phlegm and swelling in his feet and abdomen. Denies orthopnea, sleeps on two thin pillows regularly, but states he wakes up several times at night. Currently pt states his SOB has resolved. Denies fevers/chills, CP, abdominal pain, changes in BMs, or dysuria.     ED course: HDS, BPs in 140s, sat 98% on RA. Trop down to 902 from 1000s yesterday. pro-BNP 6824. CXR with decreased pulmonary congestion compared to yesterday. Seen by cardiology, recommended ASA, brillinta load, heparin gtt, and diuresis. 67yo Male with h/o CKD3, CHF, CAD s/p CABG (2007), HTN, pre-DM, current smoker, recent COVID-19 (tested positive 9/8) c/o SOB. Pt presented here with similar sxs yesterday and was a planned admission for NSTEMI and COVID exacerbation, given 80mg IV lasix, however pt left AMA while awaiting for a bed. Today pt states he had worsening SOB so represented to ED. Pt states that his breathing has not felt the same since his recent admission to [facility] for COVID 10 days ago, was treated on bipap, remdesivir/dexamethasone x5d. He endorses productive cough with whitish phlegm and swelling in his feet and abdomen. Reports no associated orthopnea, sleeps on two thin pillows regularly, but states he wakes up several times at night. Reports no fevers/chills, CP, abdominal pain, changes in BMs, or dysuria.     ED course: HDS, BPs in 140s, sat 98% on RA.  Seen by cardiology; ASA, brillinta load, heparin gtt, diuresis started; 65yo Male with h/o CKD3, CHF, CAD s/p CABG (2007), HTN, pre-DM, current smoker, recent COVID-19 (tested positive 9/8) c/o severe SOB affecting ambulation. Pt presented here with similar sxs yesterday and was a planned admission for NSTEMI and COVID exacerbation, given 80mg IV lasix, however pt left AMA while awaiting for a bed. Today pt states he had worsening SOB so represented to ED. Pt states that his breathing has not felt the same since his recent admission to [facility] for COVID 10 days ago, was treated on bipap, remdesivir/dexamethasone x5d. He endorses productive cough with whitish phlegm and swelling in his feet and abdomen. Reports no associated palpitations orthopnea, sleeps on two thin pillows regularly, but states he wakes up several times at night. Reports no fevers/chills, CP, abdominal pain, changes in BMs, or dysuria.     ED course: HDS, BPs in 140s, sat 98% on RA.  Seen by cardiology; ASA, brillinta load, heparin gtt, diuresis started; 67yo Male with h/o CKD3, CHF, CAD s/p CABG (2007), HTN, pre-DM, current smoker, recent COVID-19 (tested positive 9/8) c/o severe SOB affecting ambulation and weight gain of 10lb in 3 weeks.Pt presented here with similar sxs yesterday and was a planned admission for NSTEMI and COVID exacerbation, given 80mg IV lasix, however pt left AMA while awaiting for a bed. Today pt states he had worsening SOB so represented to ED. Pt states that his breathing has not felt the same since his recent admission to [facility] for COVID 10 days ago, was treated on bipap, remdesivir/dexamethasone x5d. He endorses productive cough with whitish phlegm and swelling in his feet and abdomen. Reports no associated palpitations orthopnea, sleeps on two thin pillows regularly, but states he wakes up several times at night. Reports no fevers/chills, CP, abdominal pain, changes in BMs, or dysuria.     ED course: HDS, BPs in 140s, sat 98% on RA.  Seen by cardiology; ASA, brillinta load, heparin gtt, diuresis started;

## 2022-09-18 NOTE — ED ADULT TRIAGE NOTE - CHIEF COMPLAINT QUOTE
Pt AOX4  c/o SOB couldn't sleep last night; signed out AMA last night because they "kept him in that room" for 4 hours; was supposed to be admitted for Dx of heart failure; pt had recent 1 week stay at [place] for [facility] where they told him to f/u for possible eval of HF; pt has Hx of kidney failure  says his nephrologist's name is "Dr. Sancho Nunez."

## 2022-09-18 NOTE — ED ADULT NURSE REASSESSMENT NOTE - NS ED NURSE REASSESS COMMENT FT1
Patient wishes to leave department at this time. MD Buchanan is aware and spoke with the patient. The patient understands that they are leaving against medical advice. Patient verbalizes understanding of risks. Patient is  awake and alert and demonstrates competence to make medical decisions. Pt in no apparent distress. respiration even and non-labored. Pt eloped. Upon calling his cellphone, patient returned to the ER waiting room. IV removed. Patient wishes to leave department at this time. MD Buchanan is aware and is speaking with the patient. The patient understands that they are leaving against medical advice. Patient verbalizes understanding of risks. Patient is  awake and alert and demonstrates competence to make medical decisions. Pt in no apparent distress. respiration even and non-labored.

## 2022-09-18 NOTE — H&P ADULT - NSICDXPASTMEDICALHX_GEN_ALL_CORE_FT
PAST MEDICAL HISTORY:  CAD (coronary artery disease)     Heart failure     HTN (hypertension)     Prediabetes     Stage 3 chronic kidney disease

## 2022-09-18 NOTE — ED PROVIDER NOTE - PROGRESS NOTE DETAILS
Reyes Anderson, PGY-3- spoke with cards regarding elevated troponin. will see pt in ED. will obtain weight on pt. likely start heparin for nstemi tx. Reyes Anderson, PGY-3- patient with troponins in the 900s, will be admitted to tele. cards evaluated pt will be admitted on tele. treated for nstemi. risks/benefits of cta discussed with ed team given elevated creatinine and pt likely needing cath. will hold off on pe study at this time given pt hemodynamically stable, not requiring o2, not tachycardic. will repeat the potassium as 2nd is hemolyzed again

## 2022-09-18 NOTE — H&P ADULT - ASSESSMENT
66M with h/o CKD3, CHF, CAD s/p CABG (2007), HTN, pre-DM, current smoker, recent COVID (tested positive 9/8) presenting with SOB likely 2/2 CHF exacerbation and NSTEMI.  65yo Male with h/o CKD3, CHF, CAD s/p CABG (2007), HTN, pre-DM, current smoker, recent COVID-19 (tested positive 9/8) a/w NSTEMI and acute on chronic likely HFrEF; Found to have likely ALTAGRACIA on CKD; 65yo Male, current smoker, with h/o CKD3, CHF, CAD s/p CABG (2007), HTN, pre-DM, current smoker, recent COVID-19 (tested positive 9/8) c/o  a/w NSTEMI and acute on chronic likely HFrEF; Found to have likely ALTAGRACIA on CKD;

## 2022-09-19 ENCOUNTER — TRANSCRIPTION ENCOUNTER (OUTPATIENT)
Age: 67
End: 2022-09-19

## 2022-09-19 LAB
A1C WITH ESTIMATED AVERAGE GLUCOSE RESULT: 6.4 % — HIGH (ref 4–5.6)
ALBUMIN SERPL ELPH-MCNC: 3.2 G/DL — LOW (ref 3.3–5)
ALP SERPL-CCNC: 35 U/L — LOW (ref 40–120)
ALT FLD-CCNC: 18 U/L — SIGNIFICANT CHANGE UP (ref 4–41)
ANION GAP SERPL CALC-SCNC: 8 MMOL/L — SIGNIFICANT CHANGE UP (ref 7–14)
ANION GAP SERPL CALC-SCNC: 9 MMOL/L — SIGNIFICANT CHANGE UP (ref 7–14)
APTT BLD: 39.6 SEC — HIGH (ref 27–36.3)
APTT BLD: 48.2 SEC — HIGH (ref 27–36.3)
APTT BLD: 52.9 SEC — HIGH (ref 27–36.3)
AST SERPL-CCNC: 32 U/L — SIGNIFICANT CHANGE UP (ref 4–40)
BILIRUB SERPL-MCNC: 0.3 MG/DL — SIGNIFICANT CHANGE UP (ref 0.2–1.2)
BUN SERPL-MCNC: 89 MG/DL — HIGH (ref 7–23)
BUN SERPL-MCNC: 93 MG/DL — HIGH (ref 7–23)
CALCIUM SERPL-MCNC: 8.5 MG/DL — SIGNIFICANT CHANGE UP (ref 8.4–10.5)
CALCIUM SERPL-MCNC: 8.8 MG/DL — SIGNIFICANT CHANGE UP (ref 8.4–10.5)
CHLORIDE SERPL-SCNC: 110 MMOL/L — HIGH (ref 98–107)
CHLORIDE SERPL-SCNC: 110 MMOL/L — HIGH (ref 98–107)
CHOLEST SERPL-MCNC: 80 MG/DL — SIGNIFICANT CHANGE UP
CLOSURE TME COLL+EPINEP BLD: 75 K/UL — SIGNIFICANT CHANGE UP (ref 150–400)
CO2 SERPL-SCNC: 19 MMOL/L — LOW (ref 22–31)
CO2 SERPL-SCNC: 23 MMOL/L — SIGNIFICANT CHANGE UP (ref 22–31)
CREAT ?TM UR-MCNC: 13 MG/DL — SIGNIFICANT CHANGE UP
CREAT SERPL-MCNC: 1.8 MG/DL — HIGH (ref 0.5–1.3)
CREAT SERPL-MCNC: 1.86 MG/DL — HIGH (ref 0.5–1.3)
CRP SERPL-MCNC: 28.1 MG/L — HIGH
EGFR: 39 ML/MIN/1.73M2 — LOW
EGFR: 41 ML/MIN/1.73M2 — LOW
ESTIMATED AVERAGE GLUCOSE: 137 — SIGNIFICANT CHANGE UP
FERRITIN SERPL-MCNC: 144 NG/ML — SIGNIFICANT CHANGE UP (ref 30–400)
GLUCOSE BLDC GLUCOMTR-MCNC: 108 MG/DL — HIGH (ref 70–99)
GLUCOSE BLDC GLUCOMTR-MCNC: 115 MG/DL — HIGH (ref 70–99)
GLUCOSE BLDC GLUCOMTR-MCNC: 119 MG/DL — HIGH (ref 70–99)
GLUCOSE BLDC GLUCOMTR-MCNC: 146 MG/DL — HIGH (ref 70–99)
GLUCOSE SERPL-MCNC: 113 MG/DL — HIGH (ref 70–99)
GLUCOSE SERPL-MCNC: 142 MG/DL — HIGH (ref 70–99)
HCT VFR BLD CALC: 38.2 % — LOW (ref 39–50)
HCV AB S/CO SERPL IA: 12.92 S/CO — HIGH (ref 0–0.99)
HCV AB SERPL-IMP: REACTIVE
HCV RNA FLD QL NAA+PROBE: SIGNIFICANT CHANGE UP
HCV RNA SPEC QL PROBE+SIG AMP: SIGNIFICANT CHANGE UP
HDLC SERPL-MCNC: 42 MG/DL — SIGNIFICANT CHANGE UP
HGB BLD-MCNC: 12.8 G/DL — LOW (ref 13–17)
LDH SERPL L TO P-CCNC: 559 U/L — HIGH (ref 135–225)
LIPID PNL WITH DIRECT LDL SERPL: 27 MG/DL — SIGNIFICANT CHANGE UP
MAGNESIUM SERPL-MCNC: 2.4 MG/DL — SIGNIFICANT CHANGE UP (ref 1.6–2.6)
MAGNESIUM SERPL-MCNC: 2.6 MG/DL — SIGNIFICANT CHANGE UP (ref 1.6–2.6)
MCHC RBC-ENTMCNC: 31.2 PG — SIGNIFICANT CHANGE UP (ref 27–34)
MCHC RBC-ENTMCNC: 33.5 GM/DL — SIGNIFICANT CHANGE UP (ref 32–36)
MCV RBC AUTO: 93.2 FL — SIGNIFICANT CHANGE UP (ref 80–100)
NON HDL CHOLESTEROL: 38 MG/DL — SIGNIFICANT CHANGE UP
NRBC # BLD: 0 /100 WBCS — SIGNIFICANT CHANGE UP (ref 0–0)
NRBC # FLD: 0 K/UL — SIGNIFICANT CHANGE UP (ref 0–0)
PHOSPHATE SERPL-MCNC: 3.3 MG/DL — SIGNIFICANT CHANGE UP (ref 2.5–4.5)
PHOSPHATE SERPL-MCNC: 3.4 MG/DL — SIGNIFICANT CHANGE UP (ref 2.5–4.5)
PLATELET # BLD AUTO: 91 K/UL — LOW (ref 150–400)
POTASSIUM SERPL-MCNC: 4.6 MMOL/L — SIGNIFICANT CHANGE UP (ref 3.5–5.3)
POTASSIUM SERPL-MCNC: SIGNIFICANT CHANGE UP MMOL/L (ref 3.5–5.3)
POTASSIUM SERPL-SCNC: 4.6 MMOL/L — SIGNIFICANT CHANGE UP (ref 3.5–5.3)
POTASSIUM SERPL-SCNC: SIGNIFICANT CHANGE UP MMOL/L (ref 3.5–5.3)
PROT SERPL-MCNC: 6.2 G/DL — SIGNIFICANT CHANGE UP (ref 6–8.3)
RBC # BLD: 4.1 M/UL — LOW (ref 4.2–5.8)
RBC # FLD: 13.5 % — SIGNIFICANT CHANGE UP (ref 10.3–14.5)
SODIUM SERPL-SCNC: 137 MMOL/L — SIGNIFICANT CHANGE UP (ref 135–145)
SODIUM SERPL-SCNC: 142 MMOL/L — SIGNIFICANT CHANGE UP (ref 135–145)
SODIUM UR-SCNC: 121 MMOL/L — SIGNIFICANT CHANGE UP
TRIGL SERPL-MCNC: 56 MG/DL — SIGNIFICANT CHANGE UP
TROPONIN T, HIGH SENSITIVITY RESULT: 402 NG/L — CRITICAL HIGH
TSH SERPL-MCNC: 1.44 UIU/ML — SIGNIFICANT CHANGE UP (ref 0.27–4.2)
UUN UR-MCNC: 295 MG/DL — SIGNIFICANT CHANGE UP
WBC # BLD: 6.32 K/UL — SIGNIFICANT CHANGE UP (ref 3.8–10.5)
WBC # FLD AUTO: 6.32 K/UL — SIGNIFICANT CHANGE UP (ref 3.8–10.5)

## 2022-09-19 PROCEDURE — 99223 1ST HOSP IP/OBS HIGH 75: CPT | Mod: FS

## 2022-09-19 PROCEDURE — 93306 TTE W/DOPPLER COMPLETE: CPT | Mod: 26

## 2022-09-19 RX ORDER — DEXTROSE 50 % IN WATER 50 %
25 SYRINGE (ML) INTRAVENOUS ONCE
Refills: 0 | Status: DISCONTINUED | OUTPATIENT
Start: 2022-09-19 | End: 2022-09-23

## 2022-09-19 RX ORDER — NICOTINE POLACRILEX 2 MG
4 GUM BUCCAL EVERY 4 HOURS
Refills: 0 | Status: DISCONTINUED | OUTPATIENT
Start: 2022-09-19 | End: 2022-09-23

## 2022-09-19 RX ORDER — GLUCAGON INJECTION, SOLUTION 0.5 MG/.1ML
1 INJECTION, SOLUTION SUBCUTANEOUS ONCE
Refills: 0 | Status: DISCONTINUED | OUTPATIENT
Start: 2022-09-19 | End: 2022-09-23

## 2022-09-19 RX ORDER — TICAGRELOR 90 MG/1
90 TABLET ORAL EVERY 12 HOURS
Refills: 0 | Status: DISCONTINUED | OUTPATIENT
Start: 2022-09-19 | End: 2022-09-23

## 2022-09-19 RX ORDER — ZOLPIDEM TARTRATE 10 MG/1
5 TABLET ORAL ONCE
Refills: 0 | Status: DISCONTINUED | OUTPATIENT
Start: 2022-09-19 | End: 2022-09-19

## 2022-09-19 RX ORDER — DEXTROSE 50 % IN WATER 50 %
15 SYRINGE (ML) INTRAVENOUS ONCE
Refills: 0 | Status: DISCONTINUED | OUTPATIENT
Start: 2022-09-19 | End: 2022-09-23

## 2022-09-19 RX ORDER — SODIUM CHLORIDE 9 MG/ML
1000 INJECTION, SOLUTION INTRAVENOUS
Refills: 0 | Status: DISCONTINUED | OUTPATIENT
Start: 2022-09-19 | End: 2022-09-23

## 2022-09-19 RX ORDER — TICAGRELOR 90 MG/1
1 TABLET ORAL
Qty: 60 | Refills: 0
Start: 2022-09-19 | End: 2022-10-18

## 2022-09-19 RX ORDER — NICOTINE POLACRILEX 2 MG
1 GUM BUCCAL DAILY
Refills: 0 | Status: DISCONTINUED | OUTPATIENT
Start: 2022-09-19 | End: 2022-09-19

## 2022-09-19 RX ORDER — INSULIN LISPRO 100/ML
VIAL (ML) SUBCUTANEOUS AT BEDTIME
Refills: 0 | Status: DISCONTINUED | OUTPATIENT
Start: 2022-09-19 | End: 2022-09-23

## 2022-09-19 RX ORDER — HEPARIN SODIUM 5000 [USP'U]/ML
1200 INJECTION INTRAVENOUS; SUBCUTANEOUS
Qty: 25000 | Refills: 0 | Status: DISCONTINUED | OUTPATIENT
Start: 2022-09-19 | End: 2022-09-20

## 2022-09-19 RX ORDER — PANTOPRAZOLE SODIUM 20 MG/1
40 TABLET, DELAYED RELEASE ORAL
Refills: 0 | Status: DISCONTINUED | OUTPATIENT
Start: 2022-09-19 | End: 2022-09-23

## 2022-09-19 RX ORDER — INSULIN LISPRO 100/ML
VIAL (ML) SUBCUTANEOUS
Refills: 0 | Status: DISCONTINUED | OUTPATIENT
Start: 2022-09-19 | End: 2022-09-23

## 2022-09-19 RX ORDER — DEXTROSE 50 % IN WATER 50 %
12.5 SYRINGE (ML) INTRAVENOUS ONCE
Refills: 0 | Status: DISCONTINUED | OUTPATIENT
Start: 2022-09-19 | End: 2022-09-23

## 2022-09-19 RX ORDER — NICOTINE POLACRILEX 2 MG
1 GUM BUCCAL EVERY 4 HOURS
Refills: 0 | Status: DISCONTINUED | OUTPATIENT
Start: 2022-09-19 | End: 2022-09-19

## 2022-09-19 RX ADMIN — Medication 1000 UNIT(S): at 14:17

## 2022-09-19 RX ADMIN — HEPARIN SODIUM 1200 UNIT(S)/HR: 5000 INJECTION INTRAVENOUS; SUBCUTANEOUS at 20:24

## 2022-09-19 RX ADMIN — ATORVASTATIN CALCIUM 80 MILLIGRAM(S): 80 TABLET, FILM COATED ORAL at 21:48

## 2022-09-19 RX ADMIN — Medication 50 MILLIGRAM(S): at 07:08

## 2022-09-19 RX ADMIN — Medication 50 MILLIGRAM(S): at 21:49

## 2022-09-19 RX ADMIN — TAMSULOSIN HYDROCHLORIDE 0.4 MILLIGRAM(S): 0.4 CAPSULE ORAL at 21:48

## 2022-09-19 RX ADMIN — HEPARIN SODIUM 800 UNIT(S)/HR: 5000 INJECTION INTRAVENOUS; SUBCUTANEOUS at 01:14

## 2022-09-19 RX ADMIN — HEPARIN SODIUM 1000 UNIT(S)/HR: 5000 INJECTION INTRAVENOUS; SUBCUTANEOUS at 08:38

## 2022-09-19 RX ADMIN — Medication 40 MILLIGRAM(S): at 14:18

## 2022-09-19 RX ADMIN — ZOLPIDEM TARTRATE 5 MILLIGRAM(S): 10 TABLET ORAL at 23:17

## 2022-09-19 RX ADMIN — Medication 40 MILLIGRAM(S): at 07:08

## 2022-09-19 RX ADMIN — Medication 4 MILLIGRAM(S): at 22:46

## 2022-09-19 RX ADMIN — Medication 81 MILLIGRAM(S): at 14:17

## 2022-09-19 RX ADMIN — TICAGRELOR 90 MILLIGRAM(S): 90 TABLET ORAL at 16:58

## 2022-09-19 RX ADMIN — Medication 50 MILLIGRAM(S): at 14:17

## 2022-09-19 RX ADMIN — HEPARIN SODIUM 0 UNIT(S)/HR: 5000 INJECTION INTRAVENOUS; SUBCUTANEOUS at 00:38

## 2022-09-19 RX ADMIN — HEPARIN SODIUM 1200 UNIT(S)/HR: 5000 INJECTION INTRAVENOUS; SUBCUTANEOUS at 14:56

## 2022-09-19 RX ADMIN — HEPARIN SODIUM 1200 UNIT(S)/HR: 5000 INJECTION INTRAVENOUS; SUBCUTANEOUS at 21:53

## 2022-09-19 NOTE — DISCHARGE NOTE PROVIDER - NSDCMRMEDTOKEN_GEN_ALL_CORE_FT
aspirin 81 mg oral tablet: 1 tab(s) orally once a day  atorvastatin 80 mg oral tablet: 1 tab(s) orally once a day  Brilinta (ticagrelor) 90 mg oral tablet: 1 tab(s) orally 2 times a day     price check only please   colchicine 0.6 mg oral tablet: 1 tab(s) orally once a day  Entresto 97 mg-103 mg oral tablet: 1 tab(s) orally 2 times a day  eplerenone 25 mg oral tablet: 1 tab(s) orally once a day  Flomax 0.4 mg oral capsule: 1 cap(s) orally once a day  hydrALAZINE 50 mg oral tablet: 1 tab(s) orally 3 times a day  isosorbide dinitrate 20 mg oral tablet: 1 tab(s) sublingual once a day  Jardiance 10 mg oral tablet: 1 tab(s) orally once a day (in the morning)  Metoprolol Tartrate 50 mg oral tablet: 1 tab(s) orally 2 times a day  Plavix 75 mg oral tablet: 1 tab(s) orally once a day  Vitamin D3 25 mcg (1000 intl units) oral tablet: 1 tab(s) orally once a day   aspirin 81 mg oral tablet: 1 tab(s) orally once a day  atorvastatin 80 mg oral tablet: 1 tab(s) orally once a day  Brilinta (ticagrelor) 90 mg oral tablet: 1 tab(s) orally 2 times a day     price check only please   colchicine 0.6 mg oral tablet: 1 tab(s) orally once a day  Entresto 97 mg-103 mg oral tablet: 1 tab(s) orally 2 times a day  eplerenone 25 mg oral tablet: 1 tab(s) orally once a day  Flomax 0.4 mg oral capsule: 1 cap(s) orally once a day  hydrALAZINE 50 mg oral tablet: 1 tab(s) orally 3 times a day  isosorbide dinitrate 20 mg oral tablet: 1 tab(s) sublingual once a day  Jardiance 10 mg oral tablet: 1 tab(s) orally once a day (in the morning)  Metoprolol Tartrate 50 mg oral tablet: 1 tab(s) orally 2 times a day  Vitamin D3 25 mcg (1000 intl units) oral tablet: 1 tab(s) orally once a day   aspirin 81 mg oral tablet: 1 tab(s) orally once a day  atorvastatin 80 mg oral tablet: 1 tab(s) orally once a day  Brilinta (ticagrelor) 90 mg oral tablet: 1 tab(s) orally 2 times a day     price check only please   colchicine 0.6 mg oral tablet: 1 tab(s) orally once a day  Flomax 0.4 mg oral capsule: 1 cap(s) orally once a day  hydrALAZINE 50 mg oral tablet: 1 tab(s) orally 3 times a day  Jardiance 10 mg oral tablet: 1 tab(s) orally once a day (in the morning)  Metoprolol Tartrate 50 mg oral tablet: 1 tab(s) orally 2 times a day  Vitamin D3 25 mcg (1000 intl units) oral tablet: 1 tab(s) orally once a day   aspirin 81 mg oral tablet: 1 tab(s) orally once a day  atorvastatin 80 mg oral tablet: 1 tab(s) orally once a day  Brilinta (ticagrelor) 90 mg oral tablet: 1 tab(s) orally 2 times a day     price check only please   finasteride 5 mg oral tablet: 1 tab(s) orally once a day  Flomax 0.4 mg oral capsule: 1 cap(s) orally once a day  furosemide 40 mg oral tablet: 1 tab(s) orally once a day  hydrALAZINE 50 mg oral tablet: 1 tab(s) orally 3 times a day  Jardiance 10 mg oral tablet: 1 tab(s) orally once a day (in the morning)  metoprolol tartrate 25 mg oral tablet: 1 tab(s) orally 2 times a day   Vitamin D3 25 mcg (1000 intl units) oral tablet: 1 tab(s) orally once a day   aspirin 81 mg oral tablet: 1 tab(s) orally once a day  atorvastatin 80 mg oral tablet: 1 tab(s) orally once a day  Brilinta (ticagrelor) 90 mg oral tablet: 1 tab(s) orally 2 times a day       finasteride 5 mg oral tablet: 1 tab(s) orally once a day  Flomax 0.4 mg oral capsule: 1 cap(s) orally once a day  furosemide 40 mg oral tablet: 1 tab(s) orally once a day  hydrALAZINE 50 mg oral tablet: 1 tab(s) orally 3 times a day  Jardiance 10 mg oral tablet: 1 tab(s) orally once a day (in the morning)  metoprolol tartrate 25 mg oral tablet: 1 tab(s) orally 2 times a day   Vitamin D3 25 mcg (1000 intl units) oral tablet: 1 tab(s) orally once a day

## 2022-09-19 NOTE — DISCHARGE NOTE PROVIDER - HOSPITAL COURSE
65yo Male, current smoker, with h/o CKD3, CHF, CAD s/p CABG (2007), HTN, pre-DM, current smoker, recent COVID-19 (tested positive 9/8) c/o  a/w NSTEMI and acute on chronic likely HFrEF; Found to have likely ALTAGRACIA on CKD. Of note patient was recently in ED 9/17 w/similar complaints, brillinta loaded, placed on heparin drip and planned for LHC however left AMA.  Patient returned to the ED w/worsening shortness of breath.  EKG w/TWI in V5 & V6, high sensitivity troponins 1166. Patient was seen by cardiology, brillinta was reloaded, he was placed on a heparin drip with plans for diureses and eventual LHC when euvolemic.     Patient wanting to leave hospital against medical advice. 67yo Male, current smoker, with h/o CKD3, CHF, CAD s/p CABG (2007), HTN, pre-DM, current smoker, recent COVID-19 (tested positive 9/8) c/o  a/w NSTEMI and acute on chronic likely HFrEF; Found to have likely ALTAGRACIA on CKD. Of note patient was recently in ED 9/17 w/similar complaints, brillinta loaded, placed on heparin drip and planned for LHC however left AMA.  Patient returned to the ED w/worsening shortness of breath.  EKG w/TWI in V5 & V6, high sensitivity troponins 1166. Patient was seen by cardiology, brillinta was reloaded, he was placed on a heparin drip for possible NSTEMI. Pt was diuresed with IV Lasix now on PO, trops downtrended after diuresis therefore no longer plan for LHC. Pt can follow up with Cardiology outpatient. Course c/b urinary retention. Harper placed by Urology 9/20 now awaiting TOV.  Of note, pt had hematuria so UA was done that was slightly positive however urine culture negative and patient has no urinary symptoms therefore abx DC'ed. Pt to f/u with Urologist Dr. Dr. Joel George 9/27/22 at 10:20 as already scheduled.    **Incomplete 66 M current smoker PMHx CKD3, CHF, CAD S/P CABG (2007), HTN, pre-DM, recent COVID-19 (tested positive 9/8) a/w NSTEMI and acute on chronic likely HFrEF; Found to have likely ALTAGRACIA on CKD. Of note pt was recently in ED 9/17 w/similar complaints, Brillinta loaded, placed on heparin drip and planned for LHC however left AMA.  Pt returned to the ED w/worsening shortness of breath.  EKG w/TWI in V5 & V6, high sensitivity troponins 1166. Pt was seen by Cardiology, Brillinta was reloaded, he was placed on a heparin drip for possible NSTEMI. Pt was diuresed with IV Lasix now on PO, trops downtrended after diuresis therefore no longer plan for LHC. Pt can follow up with Cardiology outpatient. Pt monitored with improvement with Cr downtrending. Transitioned to PO Lasix   Course c/b urinary retention. Harper placed by Urology 9/20. TOV passed 9/23.   Of note, pt had hematuria so UA was done that was slightly positive however urine culture negative and pt has no urinary symptoms therefore Abx DC'ed. Pt to follow up with urologist, Dr. Joel George 9/27/22 at 10:20 as already scheduled    Spoke with attending, Dr. Clemens and cardiologist, Dr. Rao, pt is medically cleared for discharge home

## 2022-09-19 NOTE — CHART NOTE - NSCHARTNOTEFT_GEN_A_CORE
[facility] MEDICINE ACP COVERAGE    Notified by RN that pt wants ambien or threatening to leave AMA again. Pt was explained to in detail by day provider plan of care and necessity of staying admitted for further workup and management.     iSTOP checked, no record of ambien being home med     This report was requested by: Mary Holloway | Reference #: [id number]    Others' Prescriptions  Patient Name: Kailash Jacobson Date: 1955  Address: [address] [place] [id number]: Male  Rx Written	Rx Dispensed	Drug	Quantity	Days Supply	Prescriber Name	Prescriber Nadege #	Payment Method	Dispenser  12/14/2021	12/14/2021	acetaminophen-cod #3 tablet	16	4	Mark Colvin DDS	[id number]	[facility]    On review of chart, pt currently being treated for NSTEMI and HF. Very unsafe for pt to AMA at this time. Will give ambien 5mg PO x1 and place on .    LI Holloway PA-C  [id number]

## 2022-09-19 NOTE — CHART NOTE - NSCHARTNOTEFT_GEN_A_CORE
Notified by RN that patient wants to leave AMA, earlier in the day patient shared his frustration with still being in the ESSU.  He is demanding a bed stating that other hospitals have accommodated him in the past.    Patient is a 66 year old male who presented to the ED w/ worsening shortness of breath (of note left AMA day prior due to similar bed assignment concerns).  HST troponins found to be elevated @ 1100 (now 400), he was seen by cardiology who recommended Brillinta reload and heparin drip as well as diuresis, likely eventual angiogram when euvolemic (patient refusing).      Spoke to cardiology regarding discharge recommendations given that the patient is not medically cleared for discharge. Cardiology recommends doubling home diuretics for the next 3 days as well as continuing Brillinta and outpatient follow up within 1 week.    Patient seen and assessed @ bedside, currently without any complaints stating "im peeing a lot, I feel better" continually requesting to go home.  [facility]U NM Rebecca and patients family at bedside.  Reinforced with patient plan of care as well as risks of leaving against medical advice including death.     Patient now agreeable to stay, demanding bed assignment which has been escalated to bed board by NM.  Patient is aware that there is currently no bed assigned and he will remain in current location until bed available. Patient requesting to be medically cleared Tuesday, explained to patient that he will unlikely be cleared Tuesday which he understands.    Echocardiogram and lab results reviewed with patient, all questions answered and concerns addressed. Nicotine replacement therapy offered and accepted. Notified by RN that patient wants to leave AMA, earlier in the day patient shared his frustration with still being in the ESSU.  He is demanding a bed stating that other hospitals have accommodated him in the past.    Patient is a 66 year old male who presented to the ED w/ worsening shortness of breath (of note left AMA day prior due to similar bed assignment concerns).  HST troponins found to be elevated @ 1100 (now 400), he was seen by cardiology who recommended Brillinta reload and heparin drip as well as diuresis, likely eventual angiogram when euvolemic (patient refusing).      Spoke to cardiology regarding discharge recommendations given that the patient is not medically cleared for discharge. Cardiology recommends doubling home diuretics for the next 3 days as well as continuing Brillinta  (Plavix if Brillinta not covered and patient not willing to pay) and outpatient follow up within 1 week.    Patient seen and assessed @ bedside, currently without any complaints stating "im peeing a lot, I feel better" continually requesting to go home.  [facility]U NM Rebecca and patients family at bedside.  Reinforced with patient plan of care as well as risks of leaving against medical advice including death.     Patient now agreeable to stay, demanding bed assignment which has been escalated to bed board by NM.  Patient is aware that there is currently no bed assigned and he will remain in current location until bed available. Patient requesting to be medically cleared Tuesday, explained to patient that he will unlikely be cleared Tuesday which he understands.    Echocardiogram and lab results reviewed with patient, all questions answered and concerns addressed. Nicotine replacement therapy offered and accepted.

## 2022-09-19 NOTE — CONSULT NOTE ADULT - ASSESSMENT
ALTAGRACIA on CKD3  ALTAGRACIA improving  Creatinine Trend: 1.86 <--, 1.80 <--, 2.17 <--, 2.40 <--      low Na diet   monitor  BMP daily and u/o   dose all meds for eGFR  avoid ACEi/ARB/NSAIDs/Nephrotoxics. 67yo Male, current smoker, with h/o CKD3, CHF, CAD s/p CABG (2007), HTN, pre-DM, current smoker, recent COVID-19 (tested positive 9/8) c/o  a/w NSTEMI and acute on chronic likely HFrEF; Renal consulted for ALTAGRACIA on CKD Mx.   	  ALTAGRACIA on CKD3  ALTAGRACIA improving  Creatinine Trend: 1.86 <--, 1.80 <--, 2.17 <--, 2.40 <--  CKD by history. unknown b/l Cr  clinically hypervolemic  K, bicarb ok    -agree w/ lasix IV 40mg BID  -holding home entresto given ALTAGRACIA  -agree w/holding colchicine   -c/w flomax given history of BPH  -check bladder scan x1 to r/o urinary retention  low Na diet, fluid restriction 1L/day  monitor  BMP daily and u/o   dose all meds for eGFR  avoid ACEi/ARB for now/NSAIDs/Nephrotoxics.    h/o CAD and CABG w/NSTEMI (non-ST elevation myocardial infarction).   -s/p ASA, brillinta load  -started on hep gtt ACS  -trend EKGs and cardiac enzymes  -f/u TTE  -f/u cardiology eval  Acute systolic congestive heart failure.   ProBN 6.8K; CXR c/w vascular congestion/ mild pulm edema  -s/p lasix 80mg x1 in ED, still currently appears volume overloaded.  -c/w lasix IV 40mg BID, please re-evaluate volume status daily  -cardiology following, appreciate recs  -c/w home hydralazine 50 TID  -holding home metoprolol tartrate 50 BID given sinus bradycardia  -holding home entresto given ALTAGRACIA  -monitor strict I/Os, daily weights.  Thanks for consulting. will f/u closely  labs, rad, chart reviewed  poc d/w pt  For any question, pl call:  [facility]  Cell -[phone]  Office [phone]  Ans Serv [phone]

## 2022-09-19 NOTE — DISCHARGE NOTE PROVIDER - NSDCCPCAREPLAN_GEN_ALL_CORE_FT
PRINCIPAL DISCHARGE DIAGNOSIS  Diagnosis: NSTEMI (non-ST elevation myocardial infarction)  Assessment and Plan of Treatment: You were admitted with complaints of shortness of breath,  Your blood work shows that you had some damage to your heart likely duet o your underlying heart failure. You were given blood thinners and diuretics (water pill).    YOU LEFT THE HOSPITAL AGAINST MEDICAL ADVICE and were not able to complete your entire course of diuretics.  Double your home dose of water pills for 3 days and then continue your normal home dose.  Follow up with cardiology within 1 week of leaving the hospital.  If you experience chest pain or worsening shortness of breath proceed to the nearest Emergency department.        SECONDARY DISCHARGE DIAGNOSES  Diagnosis: CAD (coronary artery disease)  Assessment and Plan of Treatment: Continue to take your aspirin and blood thinner as ordered. Follow up with cardiology within 1 week.    Diagnosis: Acute kidney injury superimposed on CKD  Assessment and Plan of Treatment: Your kidney numbers are elevated, Do not take your entresto until you see your cardiologist.  YOU WILL NEED REPEAT BLOOD WORK.    Diagnosis: SOB (shortness of breath)  Assessment and Plan of Treatment: Your heart is fluid overloaded, continue above recommendations.     PRINCIPAL DISCHARGE DIAGNOSIS  Diagnosis: NSTEMI (non-ST elevation myocardial infarction)  Assessment and Plan of Treatment: You were admitted with complaints of shortness of breath,  Your blood work shows that you had some damage to your heart likely duet o your underlying heart failure. You were given blood thinners and diuretics (water pill).    Follow up with cardiology within 1 week of leaving the hospital.  If you experience chest pain or worsening shortness of breath proceed to the nearest Emergency department.        SECONDARY DISCHARGE DIAGNOSES  Diagnosis: SOB (shortness of breath)  Assessment and Plan of Treatment: Your heart is fluid overloaded, continue above recommendations.    Diagnosis: CAD (coronary artery disease)  Assessment and Plan of Treatment: Continue to take your aspirin and Brilinta as ordered. Follow up with cardiology within 1 week.    Diagnosis: Acute kidney injury superimposed on CKD  Assessment and Plan of Treatment: Your kidney numbers are elevated, Do not take your entresto until you see your cardiologist.  YOU WILL NEED REPEAT BLOOD WORK.     PRINCIPAL DISCHARGE DIAGNOSIS  Diagnosis: NSTEMI (non-ST elevation myocardial infarction)  Assessment and Plan of Treatment: You were admitted with complaints of shortness of breath. Your blood work shows that you had some damage to your heart likely due to your underlying heart failure. You were given blood thinners and diuretics (water pill) with improvement. Follow up with cardiology within 1 week of leaving the hospital.  If you experience chest pain or worsening shortness of breath proceed to the nearest Emergency department.        SECONDARY DISCHARGE DIAGNOSES  Diagnosis: CAD (coronary artery disease)  Assessment and Plan of Treatment: Continue to take your Aspirin and Brilinta as ordered. Follow up with cardiology within 1 week.    Diagnosis: Acute kidney injury superimposed on CKD  Assessment and Plan of Treatment: Your kidney numbers are elevated, Do not take your Entresto or Eplerenone until you see your cardiologist.  YOU WILL NEED REPEAT BLOOD WORK to monitor kidney function.    Diagnosis: Urinary retention  Assessment and Plan of Treatment: You were found to be retaining urine and a Harper catheter was placed during this admission, now removed. Continue with Proscar and Flomax as recommended. Follow up outpatient with urologist as scheduled for further management.     PRINCIPAL DISCHARGE DIAGNOSIS  Diagnosis: NSTEMI (non-ST elevation myocardial infarction)  Assessment and Plan of Treatment: You were admitted with complaints of shortness of breath. Your blood work shows that you had some damage to your heart likely due to your underlying heart failure. You were given blood thinners and diuretics (water pill) with improvement. Continue with Brilinta, Aspirin, statin and Metoprolol as recommended. Follow up with cardiology within 1 week of leaving the hospital.  If you experience chest pain or worsening shortness of breath proceed to the nearest Emergency department.        SECONDARY DISCHARGE DIAGNOSES  Diagnosis: CAD (coronary artery disease)  Assessment and Plan of Treatment: Continue to take your Aspirin and Brilinta as ordered. Follow up with cardiology within 1 week.    Diagnosis: Thrombocytopenia  Assessment and Plan of Treatment: Follow-up with your outpatient provider if further treatment is warranted. Monitor for signs/symptoms indicating worsening of disease, such as, easy bleeding/bruising, pale skin, fatigue, dizziness, increased heart rate, or chest pain.      Diagnosis: Acute kidney injury superimposed on CKD  Assessment and Plan of Treatment: Your kidney numbers are elevated, Do not take your Entresto or Eplerenone until you see your cardiologist.  YOU WILL NEED REPEAT BLOOD WORK to monitor kidney function.    Diagnosis: Urinary retention  Assessment and Plan of Treatment: You were found to be retaining urine and a Harper catheter was placed during this admission, now removed. Continue with Proscar and Flomax as recommended. Follow up outpatient with urologist as scheduled for further management.

## 2022-09-19 NOTE — DISCHARGE NOTE PROVIDER - PROVIDER TOKENS
FREE:[LAST:[Cardiology],PHONE:[(   )    -],FAX:[(   )    -],FOLLOWUP:[1 week]] PROVIDER:[TOKEN:[[id number]],FOLLOWUP:[2 weeks],ESTABLISHEDPATIENT:[T]],PROVIDER:[TOKEN:[[id number]],SCHEDULEDAPPT:[09/27/2022],SCHEDULEDAPPTTIME:[10:20 AM],ESTABLISHEDPATIENT:[T]] PROVIDER:[TOKEN:[[id number]],FOLLOWUP:[2 weeks],ESTABLISHEDPATIENT:[T]],PROVIDER:[TOKEN:[[id number]],SCHEDULEDAPPT:[09/27/2022],SCHEDULEDAPPTTIME:[10:20 AM],ESTABLISHEDPATIENT:[T]],PROVIDER:[TOKEN:[[id number]]]

## 2022-09-19 NOTE — PROGRESS NOTE ADULT - PROBLEM SELECTOR PLAN 3
Likely acute on chronic given low serum bicarb=18; Cr 2.17 on admission, decreased compared to yesterday, unclear baseline. Likely due to acute CHF;   -CKD likely hypertensive nephropathy given pt on 3 antihypertensives  -Etiology possibly prerenal iso CHF exacerbation, cardiogenic etiology BUN/Cr>20  -Monitor for RF improvement with diuresis   -hold colchicine iso ALTAGRACIA  -monitor I/Os  -c/w flomax given history of BPH  -check bladder scan x1 to r/o urinary retention  -check urine lytes

## 2022-09-19 NOTE — DISCHARGE NOTE PROVIDER - NSDCFUADDAPPT_GEN_ALL_CORE_FT
APPTS ARE READY TO BE MADE: [ x ] YES    Best Family or Patient Contact (if needed): Patient has cell phone on him so he can be reached at [phone]. Other contact is Eri Naranjo ( Daughter ) ([phone])      Additional Information about above appointments (if needed):    1: Follow up with Cardiologist Dr. Wilbur Austin in 1-2 weeks.  2: Follow up with PCP (Medicine clinic was emailed for assistance since patient does not have PCP).  3: Follow up with Urologist (patient has appointment already for 9/27/22 at 10:20AM)    Other comments or requests:    APPTS ARE READY TO BE MADE: [ x ] YES    Best Family or Patient Contact (if needed): Patient has cell phone on him so he can be reached at [phone]. Other contact is Eri Naranjo ( Daughter ) ([phone])      Additional Information about above appointments (if needed):    1: Follow up with Cardiologist Dr. Wilbur Austin in 1-2 weeks.  2: Follow up with PCP or if needed - please follow up with Dr. Clemens at [phone].  3: Follow up with Urologist (patient has appointment already for 9/27/22 at 10:20AM)    Other comments or requests:    APPTS ARE READY TO BE MADE: [ x ] YES    Best Family or Patient Contact (if needed): Patient has cell phone on him so he can be reached at [phone]. Other contact is Eri Naranjo ( Daughter ) ([phone])      Additional Information about above appointments (if needed):    1: Follow up with Cardiologist Dr. Wilbur Austin in 1-2 weeks.  2: Follow up with PCP or if needed - please follow up with Dr. Clemens at [phone].  3: Follow up with Urologist (patient has appointment already for 9/27/22 at 10:20AM)    Other comments or requests:   Patient was provided with follow up request details and was advised to call to schedule follow up within specified time frame.   Patient was scheduled with Dr. Mcdonnell on 10/7 2:30pm at [address] Patient advised of appointment details.

## 2022-09-19 NOTE — PROGRESS NOTE ADULT - PROBLEM SELECTOR PLAN 1
Acute SOB improved after giving IV lasix, so suspect acute SOB 2/2 NSTEMI possibly leading to CHF exacerbation since pt has history of CAD and CABG, active smoker with cardiac risk factors; r/o covid myocarditis;  Troponin plateaued 906-->902 (24 hrs ago Trop was 1073)  -less likely PE, Well's score is 0  -EKG with TWIs in lateral leads, no ST changes  -pt has history of CAD s/p CABG, cardiac risk factors, possibly recent ischemic injury leading to CHF exacerbation  -s/p ASA, brillinta load  -started on hep gtt ACS  Plan:  -trend EKGs and cardiac enzymes  -check TTE  -c/w heparin gtt, ASA  -f/u cardiology for planned C

## 2022-09-19 NOTE — DISCHARGE NOTE PROVIDER - CARE PROVIDER_API CALL
Cardiology,   Phone: (   )    -  Fax: (   )    -  Follow Up Time: 1 week   RUBY VIERA  Cardiovascular Diseases  [address] [place] [address]  Phone: [phone]  Fax: [phone]  Established Patient  Follow Up Time: 2 weeks    Joel George)  Urology  [address] [place] [address]  Phone: [phone]  Fax: [phone]  Established Patient  Scheduled Appointment: 09/27/2022 10:20 AM   RUBY VIERA  Cardiovascular Diseases  [address] [place] [address]  Phone: [phone]  Fax: [phone]  Established Patient  Follow Up Time: 2 weeks    Joel George)  Urology  [address] [place] [address], [address] [address]  Phone: [phone]  Fax: [phone]  Established Patient  Scheduled Appointment: 09/27/2022 10:20 AM    Scott Clemens)  Medicine  [address] [place] [address]  Phone: [phone]  Fax: [phone]  Follow Up Time:

## 2022-09-19 NOTE — DISCHARGE NOTE PROVIDER - CARE PROVIDERS DIRECT ADDRESSES
,DirectAddress_Unknown ,DirectAddress_Unknown,[email or web address] ,[email or web address],[email or web address]

## 2022-09-19 NOTE — PROGRESS NOTE ADULT - SUBJECTIVE AND OBJECTIVE BOX
Patient is a 66y old  Male who presents with a chief complaint of SOB (19 Sep 2022 15:38)      INTERVAL HPI/OVERNIGHT EVENTS:  T(C): 36.5 (09-19-22 @ 21:45), Max: 37.1 (09-19-22 @ 14:00)  HR: 61 (09-19-22 @ 21:45) (58 - 95)  BP: 171/73 (09-19-22 @ 21:45) (134/70 - 171/73)  RR: 18 (09-19-22 @ 21:45) (18 - 18)  SpO2: 100% (09-19-22 @ 21:45) (97% - 100%)  Wt(kg): --  I&O's Summary    18 Sep 2022 07:01  -  19 Sep 2022 07:00  --------------------------------------------------------  IN: 0 mL / OUT: 300 mL / NET: -300 mL    19 Sep 2022 07:01  -  19 Sep 2022 22:15  --------------------------------------------------------  IN: 0 mL / OUT: 1850 mL / NET: -1850 mL        PAST MEDICAL & SURGICAL HISTORY:  Heart failure      Stage 3 chronic kidney disease      HTN (hypertension)      Prediabetes      CAD (coronary artery disease)      S/P CABG x 1          SOCIAL HISTORY  Alcohol:  Tobacco:  Illicit substance use:    FAMILY HISTORY:    REVIEW OF SYSTEMS:  CONSTITUTIONAL: No fever, weight loss, or fatigue  EYES: No eye pain, visual disturbances, or discharge  ENMT:  No difficulty hearing, tinnitus, vertigo; No sinus or throat pain  NECK: No pain or stiffness  RESPIRATORY: No cough, wheezing, chills or hemoptysis; No shortness of breath  CARDIOVASCULAR: No chest pain, palpitations, dizziness, or leg swelling  GASTROINTESTINAL: No abdominal or epigastric pain. No nausea, vomiting, or hematemesis; No diarrhea or constipation. No melena or hematochezia.  GENITOURINARY: No dysuria, frequency, hematuria, or incontinence  NEUROLOGICAL: No headaches, memory loss, loss of strength, numbness, or tremors  SKIN: No itching, burning, rashes, or lesions   LYMPH NODES: No enlarged glands  ENDOCRINE: No heat or cold intolerance; No hair loss  MUSCULOSKELETAL: No joint pain or swelling; No muscle, back, or extremity pain  PSYCHIATRIC: No depression, anxiety, mood swings, or difficulty sleeping  HEME/LYMPH: No easy bruising, or bleeding gums  ALLERY AND IMMUNOLOGIC: No hives or eczema    RADIOLOGY & ADDITIONAL TESTS:    Imaging Personally Reviewed:  [ ] YES  [ ] NO    Consultant(s) Notes Reviewed:  [ ] YES  [ ] NO    PHYSICAL EXAM:  GENERAL: NAD, well-groomed, well-developed  HEAD:  Atraumatic, Normocephalic  EYES: EOMI, PERRLA, conjunctiva and sclera clear  ENMT: No tonsillar erythema, exudates, or enlargement; Moist mucous membranes, Good dentition, No lesions  NECK: Supple, No JVD, Normal thyroid  NERVOUS SYSTEM:  Alert & Oriented X3, Good concentration; Motor Strength 5/5 B/L upper and lower extremities; DTRs 2+ intact and symmetric  CHEST/LUNG: Clear to percussion bilaterally; No rales, rhonchi, wheezing, or rubs  HEART: Regular rate and rhythm; No murmurs, rubs, or gallops  ABDOMEN: Soft, Nontender, Nondistended; Bowel sounds present  EXTREMITIES:  2+ Peripheral Pulses, No clubbing, cyanosis, or edema  LYMPH: No lymphadenopathy noted  SKIN: No rashes or lesions    LABS:                        12.8   6.32  )-----------( 91       ( 19 Sep 2022 01:58 )             38.2     09-19    142  |  110<H>  |  89<H>  ----------------------------<  113<H>  4.6   |  23  |  1.86<H>    Ca    8.8      19 Sep 2022 09:28  Phos  3.3     09-19  Mg     2.40     09-19    TPro  6.2  /  Alb  3.2<L>  /  TBili  0.3  /  DBili  x   /  AST  32  /  ALT  18  /  AlkPhos  35<L>  09-19    PT/INR - ( 18 Sep 2022 12:45 )   PT: 13.3 sec;   INR: 1.14 ratio         PTT - ( 19 Sep 2022 20:35 )  PTT:52.9 sec    CAPILLARY BLOOD GLUCOSE      POCT Blood Glucose.: 119 mg/dL (19 Sep 2022 17:34)  POCT Blood Glucose.: 146 mg/dL (19 Sep 2022 12:50)  POCT Blood Glucose.: 108 mg/dL (19 Sep 2022 09:12)            MEDICATIONS  (STANDING):  aspirin  chewable 81 milliGRAM(s) Oral daily  atorvastatin 80 milliGRAM(s) Oral at bedtime  cholecalciferol 1000 Unit(s) Oral daily  dextrose 5%. 1000 milliLiter(s) (50 mL/Hr) IV Continuous <Continuous>  dextrose 5%. 1000 milliLiter(s) (100 mL/Hr) IV Continuous <Continuous>  dextrose 50% Injectable 25 Gram(s) IV Push once  dextrose 50% Injectable 12.5 Gram(s) IV Push once  dextrose 50% Injectable 25 Gram(s) IV Push once  furosemide   Injectable 40 milliGRAM(s) IV Push two times a day  glucagon  Injectable 1 milliGRAM(s) IntraMuscular once  heparin  Infusion. 1200 Unit(s)/Hr (12 mL/Hr) IV Continuous <Continuous>  hydrALAZINE 50 milliGRAM(s) Oral three times a day  insulin lispro (ADMELOG) corrective regimen sliding scale   SubCutaneous three times a day before meals  insulin lispro (ADMELOG) corrective regimen sliding scale   SubCutaneous at bedtime  melatonin 6 milliGRAM(s) Oral at bedtime  pantoprazole    Tablet 40 milliGRAM(s) Oral before breakfast  tamsulosin 0.4 milliGRAM(s) Oral at bedtime  ticagrelor 90 milliGRAM(s) Oral every 12 hours  zolpidem 5 milliGRAM(s) Oral once    MEDICATIONS  (PRN):  dextrose Oral Gel 15 Gram(s) Oral once PRN Blood Glucose LESS THAN 70 milliGRAM(s)/deciliter  heparin   Injectable 4000 Unit(s) IV Push every 6 hours PRN For aPTT less than 40  nicotine  Polacrilex Lozenge 4 milliGRAM(s) Oral every 4 hours PRN smoking cessation      Care Discussed with Consultants/Other Providers [ ] YES  [ ] NO Patient is a 66y old  Male who presents with a chief complaint of SOB (19 Sep 2022 15:38)      INTERVAL HPI/OVERNIGHT EVENTS: denies any CP , c/o SOB   T(C): 36.5 (09-19-22 @ 21:45), Max: 37.1 (09-19-22 @ 14:00)  HR: 61 (09-19-22 @ 21:45) (58 - 95)  BP: 171/73 (09-19-22 @ 21:45) (134/70 - 171/73)  RR: 18 (09-19-22 @ 21:45) (18 - 18)  SpO2: 100% (09-19-22 @ 21:45) (97% - 100%)  Wt(kg): --  I&O's Summary    18 Sep 2022 07:01  -  19 Sep 2022 07:00  --------------------------------------------------------  IN: 0 mL / OUT: 300 mL / NET: -300 mL    19 Sep 2022 07:01  -  19 Sep 2022 22:15  --------------------------------------------------------  IN: 0 mL / OUT: 1850 mL / NET: -1850 mL        PAST MEDICAL & SURGICAL HISTORY:  Heart failure      Stage 3 chronic kidney disease      HTN (hypertension)      Prediabetes      CAD (coronary artery disease)      S/P CABG x 1          SOCIAL HISTORY  Alcohol:  Tobacco:  Illicit substance use:    FAMILY HISTORY:    REVIEW OF SYSTEMS:  CONSTITUTIONAL: No fever, weight loss, or fatigue  EYES: No eye pain, visual disturbances, or discharge  ENMT:  No difficulty hearing, tinnitus, vertigo; No sinus or throat pain  NECK: No pain or stiffness  RESPIRATORY: No cough, wheezing, chills or hemoptysis; No shortness of breath  CARDIOVASCULAR: No chest pain, palpitations, dizziness, or leg swelling  GASTROINTESTINAL: No abdominal or epigastric pain. No nausea, vomiting, or hematemesis; No diarrhea or constipation. No melena or hematochezia.  GENITOURINARY: No dysuria, frequency, hematuria, or incontinence  NEUROLOGICAL: No headaches, memory loss, loss of strength, numbness, or tremors  SKIN: No itching, burning, rashes, or lesions   LYMPH NODES: No enlarged glands  ENDOCRINE: No heat or cold intolerance; No hair loss  MUSCULOSKELETAL: No joint pain or swelling; No muscle, back, or extremity pain  PSYCHIATRIC: No depression, anxiety, mood swings, or difficulty sleeping  HEME/LYMPH: No easy bruising, or bleeding gums  ALLERY AND IMMUNOLOGIC: No hives or eczema    RADIOLOGY & ADDITIONAL TESTS:    Imaging Personally Reviewed:  [ ] YES  [ ] NO    Consultant(s) Notes Reviewed:  [ ] YES  [ ] NO    PHYSICAL EXAM:  GENERAL: NAD, well-groomed, well-developed  HEAD:  Atraumatic, Normocephalic  EYES: EOMI, PERRLA, conjunctiva and sclera clear  ENMT: No tonsillar erythema, exudates, or enlargement; Moist mucous membranes, Good dentition, No lesions  NECK: Supple, No JVD, Normal thyroid  NERVOUS SYSTEM:  Alert & Oriented X3, Good concentration; Motor Strength 5/5 B/L upper and lower extremities; DTRs 2+ intact and symmetric  CHEST/LUNG: Clear to percussion bilaterally; No rales, rhonchi, wheezing, or rubs  HEART: Regular rate and rhythm; No murmurs, rubs, or gallops  ABDOMEN: Soft, Nontender, Nondistended; Bowel sounds present  EXTREMITIES:  2+ Peripheral Pulses, No clubbing, cyanosis, or edema  LYMPH: No lymphadenopathy noted  SKIN: No rashes or lesions    LABS:                        12.8   6.32  )-----------( 91       ( 19 Sep 2022 01:58 )             38.2     09-19    142  |  110<H>  |  89<H>  ----------------------------<  113<H>  4.6   |  23  |  1.86<H>    Ca    8.8      19 Sep 2022 09:28  Phos  3.3     09-19  Mg     2.40     09-19    TPro  6.2  /  Alb  3.2<L>  /  TBili  0.3  /  DBili  x   /  AST  32  /  ALT  18  /  AlkPhos  35<L>  09-19    PT/INR - ( 18 Sep 2022 12:45 )   PT: 13.3 sec;   INR: 1.14 ratio         PTT - ( 19 Sep 2022 20:35 )  PTT:52.9 sec    CAPILLARY BLOOD GLUCOSE      POCT Blood Glucose.: 119 mg/dL (19 Sep 2022 17:34)  POCT Blood Glucose.: 146 mg/dL (19 Sep 2022 12:50)  POCT Blood Glucose.: 108 mg/dL (19 Sep 2022 09:12)            MEDICATIONS  (STANDING):  aspirin  chewable 81 milliGRAM(s) Oral daily  atorvastatin 80 milliGRAM(s) Oral at bedtime  cholecalciferol 1000 Unit(s) Oral daily  dextrose 5%. 1000 milliLiter(s) (50 mL/Hr) IV Continuous <Continuous>  dextrose 5%. 1000 milliLiter(s) (100 mL/Hr) IV Continuous <Continuous>  dextrose 50% Injectable 25 Gram(s) IV Push once  dextrose 50% Injectable 12.5 Gram(s) IV Push once  dextrose 50% Injectable 25 Gram(s) IV Push once  furosemide   Injectable 40 milliGRAM(s) IV Push two times a day  glucagon  Injectable 1 milliGRAM(s) IntraMuscular once  heparin  Infusion. 1200 Unit(s)/Hr (12 mL/Hr) IV Continuous <Continuous>  hydrALAZINE 50 milliGRAM(s) Oral three times a day  insulin lispro (ADMELOG) corrective regimen sliding scale   SubCutaneous three times a day before meals  insulin lispro (ADMELOG) corrective regimen sliding scale   SubCutaneous at bedtime  melatonin 6 milliGRAM(s) Oral at bedtime  pantoprazole    Tablet 40 milliGRAM(s) Oral before breakfast  tamsulosin 0.4 milliGRAM(s) Oral at bedtime  ticagrelor 90 milliGRAM(s) Oral every 12 hours  zolpidem 5 milliGRAM(s) Oral once    MEDICATIONS  (PRN):  dextrose Oral Gel 15 Gram(s) Oral once PRN Blood Glucose LESS THAN 70 milliGRAM(s)/deciliter  heparin   Injectable 4000 Unit(s) IV Push every 6 hours PRN For aPTT less than 40  nicotine  Polacrilex Lozenge 4 milliGRAM(s) Oral every 4 hours PRN smoking cessation      Care Discussed with Consultants/Other Providers [ ] YES  [ ] NO

## 2022-09-19 NOTE — CONSULT NOTE ADULT - SUBJECTIVE AND OBJECTIVE BOX
[facility] - S Jeff / Cassandra S /D Ramsey/ CARLOS Parra/ CARLOS Collins/ Mohan Allison / [facility]/ O Robby  service -[phone], office [phone]  ---------------------------------------------------------------------------------------------------------------    Patient is a 66y Male whom presented to the hospital with   Denied recent NSAID use/Abx use/iv contrast studies.    Patient seen and examined    PAST MEDICAL & SURGICAL HISTORY:  Heart failure      Stage 3 chronic kidney disease      HTN (hypertension)      Prediabetes      CAD (coronary artery disease)      S/P CABG x 1          Allergies: No Known Allergies    Home Medications Reviewed  Hospital Medications:   MEDICATIONS  (STANDING):  aspirin  chewable 81 milliGRAM(s) Oral daily  atorvastatin 80 milliGRAM(s) Oral at bedtime  cholecalciferol 1000 Unit(s) Oral daily  dextrose 5%. 1000 milliLiter(s) (50 mL/Hr) IV Continuous <Continuous>  dextrose 5%. 1000 milliLiter(s) (100 mL/Hr) IV Continuous <Continuous>  dextrose 50% Injectable 25 Gram(s) IV Push once  dextrose 50% Injectable 12.5 Gram(s) IV Push once  dextrose 50% Injectable 25 Gram(s) IV Push once  furosemide   Injectable 40 milliGRAM(s) IV Push two times a day  glucagon  Injectable 1 milliGRAM(s) IntraMuscular once  heparin  Infusion.  Unit(s)/Hr (10 mL/Hr) IV Continuous <Continuous>  hydrALAZINE 50 milliGRAM(s) Oral three times a day  insulin lispro (ADMELOG) corrective regimen sliding scale   SubCutaneous three times a day before meals  insulin lispro (ADMELOG) corrective regimen sliding scale   SubCutaneous at bedtime  melatonin 6 milliGRAM(s) Oral at bedtime  tamsulosin 0.4 milliGRAM(s) Oral at bedtime    SOCIAL HISTORY:  Denies ETOh,Smoking, illicit drug use  FAMILY HISTORY:  No pertinent family history in first degree relatives        REVIEW OF SYSTEMS:  CONSTITUTIONAL: No weakness, fevers or chills  EYES/ENT: No visual changes;  No vertigo or throat pain   NECK: No pain or stiffness  RESPIRATORY: No cough, wheezing, hemoptysis; No shortness of breath  CARDIOVASCULAR: No chest pain or palpitations.  GASTROINTESTINAL: No abdominal or epigastric pain. No nausea, vomiting, or hematemesis; No diarrhea or constipation. No melena or hematochezia.  GENITOURINARY: No dysuria, frequency, foamy urine, urinary urgency, incontinence or hematuria  NEUROLOGICAL: No numbness or weakness  SKIN: No itching, burning, rashes, or lesions   VASCULAR: No bilateral lower extremity edema.   All other review of systems is negative unless indicated above.    VITALS:  T(F): 98.7 (22 @ 14:00), Max: 98.8 (22 @ 19:15)  HR: 95 (22 @ 14:00)  BP: 134/70 (22 @ 14:00)  RR: 18 (22 @ 14:00)  SpO2: 99% (22 @ 14:00)  Wt(kg): --     @ 07:01  -   @ 07:00  --------------------------------------------------------  IN: 0 mL / OUT: 300 mL / NET: -300 mL     @ 07:01  -   @ 15:38  --------------------------------------------------------  IN: 0 mL / OUT: 1850 mL / NET: -1850 mL        Weight (kg): 103.4 ( @ 13:05)    PHYSICAL EXAM:  Constitutional: NAD  HEENT: anicteric sclera, oropharynx clear, MMM  Neck: No JVD  Respiratory: CTAB, no wheezes, rales or rhonchi  Cardiovascular: S1, S2, RRR  Gastrointestinal: BS+, soft, NT/ND  Extremities: No cyanosis or clubbing. No peripheral edema  Neurological: A/O x 3, no focal deficits  Psychiatric: Normal mood, normal affect  : No CVA tenderness. No vila.   Skin: No rashes  Vascular Access:    LABS:      142  |  110<H>  |  89<H>  ----------------------------<  113<H>  4.6   |  23  |  1.86<H>    Ca    8.8      19 Sep 2022 09:28  Phos  3.3       Mg     2.40         TPro  6.2  /  Alb  3.2<L>  /  TBili  0.3  /  DBili      /  AST  32  /  ALT  18  /  AlkPhos  35<L>      Creatinine Trend: 1.86 <--, 1.80 <--, 2.17 <--, 2.40 <--                        12.8   6.32  )-----------( 91       ( 19 Sep 2022 01:58 )             38.2     Urine Studies:  Urinalysis Basic - ( 17 Sep 2022 21:30 )    Color: Yellow / Appearance: Clear / S.023 / pH:   Gluc:  / Ketone: Negative  / Bili: Negative / Urobili: <2 mg/dL   Blood:  / Protein: Trace / Nitrite: Negative   Leuk Esterase: Negative / RBC:  / WBC    Sq Epi:  / Non Sq Epi:  / Bacteria:       Sodium, Random Urine: 121 mmol/L ( @ 09:20)  Creatinine, Random Urine: 13 mg/dL ( @ 09:20)      RADIOLOGY & ADDITIONAL STUDIES:                 [facility] - S Jeff / Cassandra S /D Ramsey/ S Fidel/ S Dennis/ Mohan Allison / [facility]/ O Robby  service -[phone], office [phone]  ---------------------------------------------------------------------------------------------------------------  Patient seen and examined in ER    65yo Male with h/o CKD3, CHF, CAD s/p CABG (), HTN, pre-DM, current smoker, recent COVID-19 (tested positive ) c/o severe SOB affecting ambulation and weight gain of 10lb in 3 weeks.Pt presented here with similar sxs yesterday and was a planned admission for NSTEMI and COVID exacerbation, given 80mg IV lasix, however pt left AMA while awaiting for a bed. Today pt states he had worsening SOB so represented to ED. Pt states that his breathing has not felt the same since his recent admission to [facility] for COVID 10 days ago, was treated on bipap, remdesivir/dexamethasone x5d. He endorses productive cough with whitish phlegm and swelling in his feet and abdomen. Reports no associated palpitations orthopnea, sleeps on two thin pillows regularly, but states he wakes up several times at night. Reports no fevers/chills, CP, abdominal pain, changes in BMs, or dysuria.   ED course: HDS, BPs in 140s, sat 98% on RA.  Seen by cardiology; ASA, brillinta load, heparin gtt, diuresis started;  Renal consulted for ALTAGRACIA on CKD Mx. history obtained from chart. pt uncooperative w/history taking. c/o sob.     PAST MEDICAL & SURGICAL HISTORY:  Heart failure      Stage 3 chronic kidney disease    HTN (hypertension)    Prediabetes    CAD (coronary artery disease)    S/P CABG x 1    Allergies: No Known Allergies    Home Medications Reviewed  Hospital Medications:   MEDICATIONS  (STANDING):  aspirin  chewable 81 milliGRAM(s) Oral daily  atorvastatin 80 milliGRAM(s) Oral at bedtime  cholecalciferol 1000 Unit(s) Oral daily  dextrose 5%. 1000 milliLiter(s) (50 mL/Hr) IV Continuous <Continuous>  dextrose 5%. 1000 milliLiter(s) (100 mL/Hr) IV Continuous <Continuous>  dextrose 50% Injectable 25 Gram(s) IV Push once  dextrose 50% Injectable 12.5 Gram(s) IV Push once  dextrose 50% Injectable 25 Gram(s) IV Push once  furosemide   Injectable 40 milliGRAM(s) IV Push two times a day  glucagon  Injectable 1 milliGRAM(s) IntraMuscular once  heparin  Infusion.  Unit(s)/Hr (10 mL/Hr) IV Continuous <Continuous>  hydrALAZINE 50 milliGRAM(s) Oral three times a day  insulin lispro (ADMELOG) corrective regimen sliding scale   SubCutaneous three times a day before meals  insulin lispro (ADMELOG) corrective regimen sliding scale   SubCutaneous at bedtime  melatonin 6 milliGRAM(s) Oral at bedtime  tamsulosin 0.4 milliGRAM(s) Oral at bedtime    SOCIAL HISTORY:  Denies ETOh,Smoking, illicit drug use  FAMILY HISTORY:  No pertinent family history in first degree relatives      REVIEW OF SYSTEMS: limited  CONSTITUTIONAL: No weakness, fevers or chills  RESPIRATORY: No cough, wheezing, hemoptysis; + shortness of breath  CARDIOVASCULAR: No chest pain or palpitations.  GASTROINTESTINAL: No abdominal or epigastric pain. No nausea, vomiting, or hematemesis; No diarrhea or constipation. No melena or hematochezia.  GENITOURINARY: No dysuria, frequency, foamy urine, urinary urgency, incontinence or hematuria  NEUROLOGICAL: No numbness or weakness  VASCULAR: No bilateral lower extremity edema.   All other review of systems is negative unless indicated above.    VITALS:  T(F): 98.7 (22 @ 14:00), Max: 98.8 (22 @ 19:15)  HR: 95 (22 @ 14:00)  BP: 134/70 (22 @ 14:00)  RR: 18 (22 @ 14:00)  SpO2: 99% (22 @ 14:00)  Wt(kg): --     @ 07:01  -   @ 07:00  --------------------------------------------------------  IN: 0 mL / OUT: 300 mL / NET: -300 mL     @ 07:01  -   @ 15:38  --------------------------------------------------------  IN: 0 mL / OUT: 1850 mL / NET: -1850 mL    Weight (kg): 103.4 ( @ 13:05)    PHYSICAL EXAM:  Constitutional: NAD  HEENT: anicteric sclera  Neck: No JVD  Respiratory: CTAB, no wheezes, b/l rhonchi  Cardiovascular: S1, S2, RRR  Gastrointestinal: BS+, soft, NT  Extremities: No peripheral edema  Neurological: A/O x 3  Psychiatric: Normal mood, normal affect  : No vila.     LABS:      142  |  110<H>  |  89<H>  ----------------------------<  113<H>  4.6   |  23  |  1.86<H>    Ca    8.8      19 Sep 2022 09:28  Phos  3.3       Mg     2.40         TPro  6.2  /  Alb  3.2<L>  /  TBili  0.3  /  DBili      /  AST  32  /  ALT  18  /  AlkPhos  35<L>      Creatinine Trend: 1.86 <--, 1.80 <--, 2.17 <--, 2.40 <--                        12.8   6.32  )-----------( 91       ( 19 Sep 2022 01:58 )             38.2     Urine Studies:  Urinalysis Basic - ( 17 Sep 2022 21:30 )    Color: Yellow / Appearance: Clear / S.023 / pH:   Gluc:  / Ketone: Negative  / Bili: Negative / Urobili: <2 mg/dL   Blood:  / Protein: Trace / Nitrite: Negative   Leuk Esterase: Negative / RBC:  / WBC    Sq Epi:  / Non Sq Epi:  / Bacteria:       Sodium, Random Urine: 121 mmol/L ( @ 09:20)  Creatinine, Random Urine: 13 mg/dL ( @ 09:20)      RADIOLOGY & ADDITIONAL STUDIES:    < from: Xray Chest 1 View- PORTABLE-Urgent (22 @ 13:49) >  IMPRESSION:  Decreased pulmonary congestion.  No acute radiologic cardiopulmonary findings.    --- End of Report ---    < end of copied text >

## 2022-09-19 NOTE — DISCHARGE NOTE PROVIDER - NSFOLLOWUPCLINICS_GEN_ALL_ED_FT
[facility]  Internal Medicine  [address] [place] [address]  Phone: [phone]  Fax: [phone]

## 2022-09-19 NOTE — DISCHARGE NOTE PROVIDER - NSDCFUSCHEDAPPT_GEN_ALL_CORE_FT
Joel Geogre  [facility] Physician [facility]  UROLOGY [address]  Scheduled Appointment: 09/27/2022     Joel George  [facility]  UROLOGY [address] [place] [address]  Scheduled Appointment: 09/27/2022    Vern Mcdonnell  [facility]  UROLOGY [address] [place] [address]  Scheduled Appointment: 10/07/2022

## 2022-09-19 NOTE — PROGRESS NOTE ADULT - PROBLEM SELECTOR PLAN 2
ProBN 6.8K; CXR c/w vascular congestion/ mild pulm edema; Suspect combined rEF and diastolic acute on chronic CHF but no prior 2D echo results on EMR to compare;  -pt follows at [facility] with Dr. Messina, will need to obtain collateral, prior echo, etc.  -s/p lasix 80mg x1 in ED, still currently appears volume overloaded. BP in 140s    Plan:  -start lasix IV 40mg BID, please re-evaluate volume status daily  [ ] check TTE  -cardiology following, appreciate recs  -c/w home hydralazine 50 TID  -hold home metoprolol tartrate 50 BID given sinus bradycardia  -hold home entresto given ALTAGRACIA, hold eplerenone given hyperK, hold isordil given softer pressures  -monitor strict I/Os, daily weights

## 2022-09-19 NOTE — CHART NOTE - NSCHARTNOTEFT_GEN_A_CORE
Entresto on hold for ALTAGRACIA on CKD, patient admits to taking own Entresto 9/19.  Discussed w/patient importance of not taking his own medications and only taking medications given by RN.  Patient stated understanding and agreed to not take any of his own medications.

## 2022-09-19 NOTE — PROGRESS NOTE ADULT - PROBLEM SELECTOR PLAN 6
-c/w home ASA  -loaded with brillinta earlier so Plavix on hold  -c/w atorvastatin 80  -check a1c, lipids, tsh  -Plan as above for NSTEMI

## 2022-09-19 NOTE — PROGRESS NOTE ADULT - PROBLEM SELECTOR PLAN 5
-pt tested positive 9/8 and received 5d remdesivir/dexamethasone  -pt is satting well without supplemental oxygen needs  -monitor off COVID treatment  -check d-dimer, ldh, crp/esr

## 2022-09-20 LAB
AMPHET UR-MCNC: NEGATIVE — SIGNIFICANT CHANGE UP
ANION GAP SERPL CALC-SCNC: 8 MMOL/L — SIGNIFICANT CHANGE UP (ref 7–14)
APTT BLD: 121.1 SEC — CRITICAL HIGH (ref 27–36.3)
APTT BLD: 38.5 SEC — HIGH (ref 27–36.3)
BARBITURATES UR SCN-MCNC: NEGATIVE — SIGNIFICANT CHANGE UP
BENZODIAZ UR-MCNC: NEGATIVE — SIGNIFICANT CHANGE UP
BUN SERPL-MCNC: 68 MG/DL — HIGH (ref 7–23)
CALCIUM SERPL-MCNC: 8.8 MG/DL — SIGNIFICANT CHANGE UP (ref 8.4–10.5)
CHLORIDE SERPL-SCNC: 108 MMOL/L — HIGH (ref 98–107)
CO2 SERPL-SCNC: 23 MMOL/L — SIGNIFICANT CHANGE UP (ref 22–31)
COCAINE METAB.OTHER UR-MCNC: NEGATIVE — SIGNIFICANT CHANGE UP
CREAT SERPL-MCNC: 1.54 MG/DL — HIGH (ref 0.5–1.3)
CREATININE URINE RESULT, DAU: 16 MG/DL — SIGNIFICANT CHANGE UP
D DIMER BLD IA.RAPID-MCNC: 712 NG/ML DDU — HIGH
EGFR: 49 ML/MIN/1.73M2 — LOW
ERYTHROCYTE [SEDIMENTATION RATE] IN BLOOD: 16 MM/HR — HIGH (ref 1–15)
GLUCOSE BLDC GLUCOMTR-MCNC: 110 MG/DL — HIGH (ref 70–99)
GLUCOSE BLDC GLUCOMTR-MCNC: 120 MG/DL — HIGH (ref 70–99)
GLUCOSE BLDC GLUCOMTR-MCNC: 133 MG/DL — HIGH (ref 70–99)
GLUCOSE BLDC GLUCOMTR-MCNC: 147 MG/DL — HIGH (ref 70–99)
GLUCOSE BLDC GLUCOMTR-MCNC: 157 MG/DL — HIGH (ref 70–99)
GLUCOSE SERPL-MCNC: 107 MG/DL — HIGH (ref 70–99)
HCT VFR BLD CALC: 36.9 % — LOW (ref 39–50)
HGB BLD-MCNC: 12.4 G/DL — LOW (ref 13–17)
MAGNESIUM SERPL-MCNC: 2.1 MG/DL — SIGNIFICANT CHANGE UP (ref 1.6–2.6)
MCHC RBC-ENTMCNC: 31.1 PG — SIGNIFICANT CHANGE UP (ref 27–34)
MCHC RBC-ENTMCNC: 33.6 GM/DL — SIGNIFICANT CHANGE UP (ref 32–36)
MCV RBC AUTO: 92.5 FL — SIGNIFICANT CHANGE UP (ref 80–100)
METHADONE UR-MCNC: NEGATIVE — SIGNIFICANT CHANGE UP
NRBC # BLD: 0 /100 WBCS — SIGNIFICANT CHANGE UP (ref 0–0)
NRBC # FLD: 0 K/UL — SIGNIFICANT CHANGE UP (ref 0–0)
OPIATES UR-MCNC: NEGATIVE — SIGNIFICANT CHANGE UP
OXYCODONE UR-MCNC: NEGATIVE — SIGNIFICANT CHANGE UP
PCP SPEC-MCNC: SIGNIFICANT CHANGE UP
PCP UR-MCNC: NEGATIVE — SIGNIFICANT CHANGE UP
PHOSPHATE SERPL-MCNC: 2.4 MG/DL — LOW (ref 2.5–4.5)
PLATELET # BLD AUTO: 86 K/UL — LOW (ref 150–400)
POTASSIUM SERPL-MCNC: 4.7 MMOL/L — SIGNIFICANT CHANGE UP (ref 3.5–5.3)
POTASSIUM SERPL-SCNC: 4.7 MMOL/L — SIGNIFICANT CHANGE UP (ref 3.5–5.3)
RBC # BLD: 3.99 M/UL — LOW (ref 4.2–5.8)
RBC # FLD: 13.5 % — SIGNIFICANT CHANGE UP (ref 10.3–14.5)
SODIUM SERPL-SCNC: 139 MMOL/L — SIGNIFICANT CHANGE UP (ref 135–145)
THC UR QL: NEGATIVE — SIGNIFICANT CHANGE UP
TROPONIN T, HIGH SENSITIVITY RESULT: 247 NG/L — CRITICAL HIGH
TROPONIN T, HIGH SENSITIVITY RESULT: 265 NG/L — CRITICAL HIGH
WBC # BLD: 7.17 K/UL — SIGNIFICANT CHANGE UP (ref 3.8–10.5)
WBC # FLD AUTO: 7.17 K/UL — SIGNIFICANT CHANGE UP (ref 3.8–10.5)

## 2022-09-20 RX ORDER — ENOXAPARIN SODIUM 100 MG/ML
40 INJECTION SUBCUTANEOUS EVERY 24 HOURS
Refills: 0 | Status: DISCONTINUED | OUTPATIENT
Start: 2022-09-20 | End: 2022-09-23

## 2022-09-20 RX ORDER — FINASTERIDE 5 MG/1
5 TABLET, FILM COATED ORAL DAILY
Refills: 0 | Status: DISCONTINUED | OUTPATIENT
Start: 2022-09-20 | End: 2022-09-23

## 2022-09-20 RX ORDER — HEPARIN SODIUM 5000 [USP'U]/ML
6000 INJECTION INTRAVENOUS; SUBCUTANEOUS EVERY 6 HOURS
Refills: 0 | Status: DISCONTINUED | OUTPATIENT
Start: 2022-09-20 | End: 2022-09-20

## 2022-09-20 RX ORDER — ZOLPIDEM TARTRATE 10 MG/1
5 TABLET ORAL AT BEDTIME
Refills: 0 | Status: DISCONTINUED | OUTPATIENT
Start: 2022-09-20 | End: 2022-09-20

## 2022-09-20 RX ORDER — LIDOCAINE 4 G/100G
1 CREAM TOPICAL
Refills: 0 | Status: DISCONTINUED | OUTPATIENT
Start: 2022-09-20 | End: 2022-09-23

## 2022-09-20 RX ADMIN — HEPARIN SODIUM 1200 UNIT(S)/HR: 5000 INJECTION INTRAVENOUS; SUBCUTANEOUS at 13:52

## 2022-09-20 RX ADMIN — ENOXAPARIN SODIUM 40 MILLIGRAM(S): 100 INJECTION SUBCUTANEOUS at 17:08

## 2022-09-20 RX ADMIN — Medication 50 MILLIGRAM(S): at 13:52

## 2022-09-20 RX ADMIN — Medication 1000 UNIT(S): at 10:46

## 2022-09-20 RX ADMIN — Medication 81 MILLIGRAM(S): at 10:46

## 2022-09-20 RX ADMIN — Medication 40 MILLIGRAM(S): at 13:52

## 2022-09-20 RX ADMIN — LIDOCAINE 1 APPLICATION(S): 4 CREAM TOPICAL at 17:08

## 2022-09-20 RX ADMIN — TICAGRELOR 90 MILLIGRAM(S): 90 TABLET ORAL at 17:08

## 2022-09-20 RX ADMIN — PANTOPRAZOLE SODIUM 40 MILLIGRAM(S): 20 TABLET, DELAYED RELEASE ORAL at 05:23

## 2022-09-20 RX ADMIN — HEPARIN SODIUM 1200 UNIT(S)/HR: 5000 INJECTION INTRAVENOUS; SUBCUTANEOUS at 13:07

## 2022-09-20 RX ADMIN — ATORVASTATIN CALCIUM 80 MILLIGRAM(S): 80 TABLET, FILM COATED ORAL at 21:37

## 2022-09-20 RX ADMIN — ZOLPIDEM TARTRATE 5 MILLIGRAM(S): 10 TABLET ORAL at 22:10

## 2022-09-20 RX ADMIN — Medication 1: at 14:31

## 2022-09-20 RX ADMIN — HEPARIN SODIUM 1500 UNIT(S)/HR: 5000 INJECTION INTRAVENOUS; SUBCUTANEOUS at 08:27

## 2022-09-20 RX ADMIN — HEPARIN SODIUM 0 UNIT(S)/HR: 5000 INJECTION INTRAVENOUS; SUBCUTANEOUS at 11:31

## 2022-09-20 RX ADMIN — Medication 50 MILLIGRAM(S): at 21:37

## 2022-09-20 RX ADMIN — HEPARIN SODIUM 1500 UNIT(S)/HR: 5000 INJECTION INTRAVENOUS; SUBCUTANEOUS at 05:01

## 2022-09-20 RX ADMIN — HEPARIN SODIUM 6000 UNIT(S): 5000 INJECTION INTRAVENOUS; SUBCUTANEOUS at 05:24

## 2022-09-20 RX ADMIN — Medication 40 MILLIGRAM(S): at 05:27

## 2022-09-20 RX ADMIN — TAMSULOSIN HYDROCHLORIDE 0.4 MILLIGRAM(S): 0.4 CAPSULE ORAL at 21:37

## 2022-09-20 RX ADMIN — Medication 6 MILLIGRAM(S): at 21:37

## 2022-09-20 RX ADMIN — TICAGRELOR 90 MILLIGRAM(S): 90 TABLET ORAL at 05:23

## 2022-09-20 RX ADMIN — Medication 50 MILLIGRAM(S): at 05:23

## 2022-09-20 NOTE — PATIENT PROFILE ADULT - FALL HARM RISK - HARM RISK INTERVENTIONS

## 2022-09-20 NOTE — PROCEDURE NOTE - NSPROCDETAILS_GEN_ALL_CORE
sterile technique, indwelling urinary device inserted/sterile technique, non-indwelling urinary device inserted/prior to placement, an active physician order for the placement of a urinary catheter was verified/a urinary catheter insertion kit was used for all insertion materials

## 2022-09-20 NOTE — PROCEDURE NOTE - ADDITIONAL PROCEDURE DETAILS
Vila catheter went in easy with no resistance  Recommend lidocaine 2% gel to the tip of the penis PRN. Patient has irritation at tip of penis already from leakage and having catheter in will irritate that more  - Patient should keep vila and follow up outpatient with urologist Dr. Mcdonnell for TOV when discharged  - Continue moisturizing the tip of the penis and using lidocaine 2% gel  - Patient most likely has chronic retention due to BPH

## 2022-09-20 NOTE — PROGRESS NOTE ADULT - SUBJECTIVE AND OBJECTIVE BOX
Patient is a 66y old  Male who presents with a chief complaint of SOB (20 Sep 2022 14:40)      INTERVAL HPI/OVERNIGHT EVENTS: urinary retention s/p vila   T(C): 36.6 (09-20-22 @ 17:40), Max: 37 (09-20-22 @ 01:45)  HR: 84 (09-20-22 @ 17:40) (61 - 84)  BP: 136/60 (09-20-22 @ 17:40) (136/60 - 165/73)  RR: 18 (09-20-22 @ 17:40) (17 - 18)  SpO2: 100% (09-20-22 @ 17:40) (100% - 100%)  Wt(kg): --  I&O's Summary    19 Sep 2022 07:01  -  20 Sep 2022 07:00  --------------------------------------------------------  IN: 0 mL / OUT: 1850 mL / NET: -1850 mL    20 Sep 2022 07:01  -  20 Sep 2022 21:12  --------------------------------------------------------  IN: 200 mL / OUT: 3800 mL / NET: -3600 mL        PAST MEDICAL & SURGICAL HISTORY:  Heart failure      Stage 3 chronic kidney disease      HTN (hypertension)      Prediabetes      CAD (coronary artery disease)      S/P CABG x 1          SOCIAL HISTORY  Alcohol:  Tobacco:  Illicit substance use:    FAMILY HISTORY:    REVIEW OF SYSTEMS:  CONSTITUTIONAL: No fever, weight loss, or fatigue  EYES: No eye pain, visual disturbances, or discharge  ENMT:  No difficulty hearing, tinnitus, vertigo; No sinus or throat pain  NECK: No pain or stiffness  RESPIRATORY: No cough, wheezing, chills or hemoptysis; No shortness of breath  CARDIOVASCULAR: No chest pain, palpitations, dizziness, or leg swelling  GASTROINTESTINAL: No abdominal or epigastric pain. No nausea, vomiting, or hematemesis; No diarrhea or constipation. No melena or hematochezia.  GENITOURINARY: No dysuria, frequency, hematuria, or incontinence  NEUROLOGICAL: No headaches, memory loss, loss of strength, numbness, or tremors  SKIN: No itching, burning, rashes, or lesions   LYMPH NODES: No enlarged glands  ENDOCRINE: No heat or cold intolerance; No hair loss  MUSCULOSKELETAL: No joint pain or swelling; No muscle, back, or extremity pain  PSYCHIATRIC: No depression, anxiety, mood swings, or difficulty sleeping  HEME/LYMPH: No easy bruising, or bleeding gums  ALLERY AND IMMUNOLOGIC: No hives or eczema    RADIOLOGY & ADDITIONAL TESTS:    Imaging Personally Reviewed:  [ ] YES  [ ] NO    Consultant(s) Notes Reviewed:  [ ] YES  [ ] NO    PHYSICAL EXAM:  GENERAL: NAD, well-groomed, well-developed  HEAD:  Atraumatic, Normocephalic  EYES: EOMI, PERRLA, conjunctiva and sclera clear  ENMT: No tonsillar erythema, exudates, or enlargement; Moist mucous membranes, Good dentition, No lesions  NECK: Supple, No JVD, Normal thyroid  NERVOUS SYSTEM:  Alert & Oriented X3, Good concentration; Motor Strength 5/5 B/L upper and lower extremities; DTRs 2+ intact and symmetric  CHEST/LUNG: Clear to percussion bilaterally; No rales, rhonchi, wheezing, or rubs  HEART: Regular rate and rhythm; No murmurs, rubs, or gallops  ABDOMEN: Soft, Nontender, Nondistended; Bowel sounds present  EXTREMITIES:  2+ Peripheral Pulses, No clubbing, cyanosis, or edema  LYMPH: No lymphadenopathy noted  SKIN: No rashes or lesions    LABS:                        12.4   7.17  )-----------( 86       ( 20 Sep 2022 04:18 )             36.9     09-20    139  |  108<H>  |  68<H>  ----------------------------<  107<H>  4.7   |  23  |  1.54<H>    Ca    8.8      20 Sep 2022 04:18  Phos  2.4     09-20  Mg     2.10     09-20    TPro  6.2  /  Alb  3.2<L>  /  TBili  0.3  /  DBili  x   /  AST  32  /  ALT  18  /  AlkPhos  35<L>  09-19    PTT - ( 20 Sep 2022 10:39 )  PTT:121.1 sec    CAPILLARY BLOOD GLUCOSE      POCT Blood Glucose.: 133 mg/dL (20 Sep 2022 17:06)  POCT Blood Glucose.: 157 mg/dL (20 Sep 2022 13:49)  POCT Blood Glucose.: 120 mg/dL (20 Sep 2022 12:52)  POCT Blood Glucose.: 110 mg/dL (20 Sep 2022 08:55)  POCT Blood Glucose.: 115 mg/dL (19 Sep 2022 22:16)            MEDICATIONS  (STANDING):  aspirin  chewable 81 milliGRAM(s) Oral daily  atorvastatin 80 milliGRAM(s) Oral at bedtime  cholecalciferol 1000 Unit(s) Oral daily  dextrose 5%. 1000 milliLiter(s) (50 mL/Hr) IV Continuous <Continuous>  dextrose 5%. 1000 milliLiter(s) (100 mL/Hr) IV Continuous <Continuous>  dextrose 50% Injectable 25 Gram(s) IV Push once  dextrose 50% Injectable 12.5 Gram(s) IV Push once  dextrose 50% Injectable 25 Gram(s) IV Push once  enoxaparin Injectable 40 milliGRAM(s) SubCutaneous every 24 hours  finasteride 5 milliGRAM(s) Oral daily  furosemide   Injectable 40 milliGRAM(s) IV Push two times a day  glucagon  Injectable 1 milliGRAM(s) IntraMuscular once  hydrALAZINE 50 milliGRAM(s) Oral three times a day  insulin lispro (ADMELOG) corrective regimen sliding scale   SubCutaneous three times a day before meals  insulin lispro (ADMELOG) corrective regimen sliding scale   SubCutaneous at bedtime  melatonin 6 milliGRAM(s) Oral at bedtime  pantoprazole    Tablet 40 milliGRAM(s) Oral before breakfast  tamsulosin 0.4 milliGRAM(s) Oral at bedtime  ticagrelor 90 milliGRAM(s) Oral every 12 hours    MEDICATIONS  (PRN):  dextrose Oral Gel 15 Gram(s) Oral once PRN Blood Glucose LESS THAN 70 milliGRAM(s)/deciliter  lidocaine 2% Gel 1 Application(s) Topical two times a day PRN pain at vila insertion site  nicotine  Polacrilex Lozenge 4 milliGRAM(s) Oral every 4 hours PRN smoking cessation      Care Discussed with Consultants/Other Providers [ ] YES  [ ] NO Patient is a 66y old  Male who presents with a chief complaint of SOB (20 Sep 2022 14:40)      INTERVAL HPI/OVERNIGHT EVENTS: urinary retention s/p julito , denies any CP  T(C): 36.6 (09-20-22 @ 17:40), Max: 37 (09-20-22 @ 01:45)  HR: 84 (09-20-22 @ 17:40) (61 - 84)  BP: 136/60 (09-20-22 @ 17:40) (136/60 - 165/73)  RR: 18 (09-20-22 @ 17:40) (17 - 18)  SpO2: 100% (09-20-22 @ 17:40) (100% - 100%)  Wt(kg): --  I&O's Summary    19 Sep 2022 07:01  -  20 Sep 2022 07:00  --------------------------------------------------------  IN: 0 mL / OUT: 1850 mL / NET: -1850 mL    20 Sep 2022 07:01  -  20 Sep 2022 21:12  --------------------------------------------------------  IN: 200 mL / OUT: 3800 mL / NET: -3600 mL        PAST MEDICAL & SURGICAL HISTORY:  Heart failure      Stage 3 chronic kidney disease      HTN (hypertension)      Prediabetes      CAD (coronary artery disease)      S/P CABG x 1          SOCIAL HISTORY  Alcohol:  Tobacco:  Illicit substance use:    FAMILY HISTORY:    REVIEW OF SYSTEMS:  CONSTITUTIONAL: No fever, weight loss, or fatigue  EYES: No eye pain, visual disturbances, or discharge  ENMT:  No difficulty hearing, tinnitus, vertigo; No sinus or throat pain  NECK: No pain or stiffness  RESPIRATORY: No cough, wheezing, chills or hemoptysis; No shortness of breath  CARDIOVASCULAR: No chest pain, palpitations, dizziness, or leg swelling  GASTROINTESTINAL: No abdominal or epigastric pain. No nausea, vomiting, or hematemesis; No diarrhea or constipation. No melena or hematochezia.  GENITOURINARY: No dysuria, frequency, hematuria, or incontinence  NEUROLOGICAL: No headaches, memory loss, loss of strength, numbness, or tremors  SKIN: No itching, burning, rashes, or lesions   LYMPH NODES: No enlarged glands  ENDOCRINE: No heat or cold intolerance; No hair loss  MUSCULOSKELETAL: No joint pain or swelling; No muscle, back, or extremity pain  PSYCHIATRIC: No depression, anxiety, mood swings, or difficulty sleeping  HEME/LYMPH: No easy bruising, or bleeding gums  ALLERY AND IMMUNOLOGIC: No hives or eczema    RADIOLOGY & ADDITIONAL TESTS:    Imaging Personally Reviewed:  [ ] YES  [ ] NO    Consultant(s) Notes Reviewed:  [ ] YES  [ ] NO    PHYSICAL EXAM:  GENERAL: NAD, well-groomed, well-developed  HEAD:  Atraumatic, Normocephalic  EYES: EOMI, PERRLA, conjunctiva and sclera clear  ENMT: No tonsillar erythema, exudates, or enlargement; Moist mucous membranes, Good dentition, No lesions  NECK: Supple, No JVD, Normal thyroid  NERVOUS SYSTEM:  Alert & Oriented X3, Good concentration; Motor Strength 5/5 B/L upper and lower extremities; DTRs 2+ intact and symmetric  CHEST/LUNG: Clear to percussion bilaterally; No rales, rhonchi, wheezing, or rubs  HEART: Regular rate and rhythm; No murmurs, rubs, or gallops  ABDOMEN: Soft, Nontender, Nondistended; Bowel sounds present  EXTREMITIES:  2+ Peripheral Pulses, No clubbing, cyanosis, or edema  LYMPH: No lymphadenopathy noted  SKIN: No rashes or lesions    LABS:                        12.4   7.17  )-----------( 86       ( 20 Sep 2022 04:18 )             36.9     09-20    139  |  108<H>  |  68<H>  ----------------------------<  107<H>  4.7   |  23  |  1.54<H>    Ca    8.8      20 Sep 2022 04:18  Phos  2.4     09-20  Mg     2.10     09-20    TPro  6.2  /  Alb  3.2<L>  /  TBili  0.3  /  DBili  x   /  AST  32  /  ALT  18  /  AlkPhos  35<L>  09-19    PTT - ( 20 Sep 2022 10:39 )  PTT:121.1 sec    CAPILLARY BLOOD GLUCOSE      POCT Blood Glucose.: 133 mg/dL (20 Sep 2022 17:06)  POCT Blood Glucose.: 157 mg/dL (20 Sep 2022 13:49)  POCT Blood Glucose.: 120 mg/dL (20 Sep 2022 12:52)  POCT Blood Glucose.: 110 mg/dL (20 Sep 2022 08:55)  POCT Blood Glucose.: 115 mg/dL (19 Sep 2022 22:16)            MEDICATIONS  (STANDING):  aspirin  chewable 81 milliGRAM(s) Oral daily  atorvastatin 80 milliGRAM(s) Oral at bedtime  cholecalciferol 1000 Unit(s) Oral daily  dextrose 5%. 1000 milliLiter(s) (50 mL/Hr) IV Continuous <Continuous>  dextrose 5%. 1000 milliLiter(s) (100 mL/Hr) IV Continuous <Continuous>  dextrose 50% Injectable 25 Gram(s) IV Push once  dextrose 50% Injectable 12.5 Gram(s) IV Push once  dextrose 50% Injectable 25 Gram(s) IV Push once  enoxaparin Injectable 40 milliGRAM(s) SubCutaneous every 24 hours  finasteride 5 milliGRAM(s) Oral daily  furosemide   Injectable 40 milliGRAM(s) IV Push two times a day  glucagon  Injectable 1 milliGRAM(s) IntraMuscular once  hydrALAZINE 50 milliGRAM(s) Oral three times a day  insulin lispro (ADMELOG) corrective regimen sliding scale   SubCutaneous three times a day before meals  insulin lispro (ADMELOG) corrective regimen sliding scale   SubCutaneous at bedtime  melatonin 6 milliGRAM(s) Oral at bedtime  pantoprazole    Tablet 40 milliGRAM(s) Oral before breakfast  tamsulosin 0.4 milliGRAM(s) Oral at bedtime  ticagrelor 90 milliGRAM(s) Oral every 12 hours    MEDICATIONS  (PRN):  dextrose Oral Gel 15 Gram(s) Oral once PRN Blood Glucose LESS THAN 70 milliGRAM(s)/deciliter  lidocaine 2% Gel 1 Application(s) Topical two times a day PRN pain at vila insertion site  nicotine  Polacrilex Lozenge 4 milliGRAM(s) Oral every 4 hours PRN smoking cessation      Care Discussed with Consultants/Other Providers [ ] YES  [ ] NO

## 2022-09-20 NOTE — PROGRESS NOTE ADULT - SUBJECTIVE AND OBJECTIVE BOX
[facility] - LI Aguilar / LI Trujillo / KASSY Mustafa/ LI Parra/ LI Collins/ CARMENCITA Allison / ANIA Escobedo / CHASTITY Bustamante  ---------------------------------------------------------------------------------------------------------------  seen and examined today for ALTAGRACIA on CKD  Interval : NAD  VITALS:  T(F): 97.5 (09-20-22 @ 13:50), Max: 98.6 (09-20-22 @ 01:45)  HR: 81 (09-20-22 @ 13:50)  BP: 138/67 (09-20-22 @ 13:50)  RR: 18 (09-20-22 @ 13:50)  SpO2: 100% (09-20-22 @ 13:50)  Wt(kg): --    09-19 @ 07:01  -  09-20 @ 07:00  --------------------------------------------------------  IN: 0 mL / OUT: 1850 mL / NET: -1850 mL    09-20 @ 07:01  -  09-20 @ 14:40  --------------------------------------------------------  IN: 0 mL / OUT: 1400 mL / NET: -1400 mL      Physical Exam :-  Constitutional: NAD  Neck: Supple.  Respiratory: Bilateral equal breath sounds,  Cardiovascular: S1, S2 normal,  Gastrointestinal: Bowel Sounds present, soft, non tender.  Extremities: No edema  Neurological: Alert and Oriented x 3, no focal deficits  Psychiatric: Normal mood, normal affect  Data:-  Allergies :   No Known Allergies    Hospital Medications:   MEDICATIONS  (STANDING):  aspirin  chewable 81 milliGRAM(s) Oral daily  atorvastatin 80 milliGRAM(s) Oral at bedtime  cholecalciferol 1000 Unit(s) Oral daily  dextrose 5%. 1000 milliLiter(s) (50 mL/Hr) IV Continuous <Continuous>  dextrose 5%. 1000 milliLiter(s) (100 mL/Hr) IV Continuous <Continuous>  dextrose 50% Injectable 25 Gram(s) IV Push once  dextrose 50% Injectable 12.5 Gram(s) IV Push once  dextrose 50% Injectable 25 Gram(s) IV Push once  furosemide   Injectable 40 milliGRAM(s) IV Push two times a day  glucagon  Injectable 1 milliGRAM(s) IntraMuscular once  heparin  Infusion. 1200 Unit(s)/Hr (12 mL/Hr) IV Continuous <Continuous>  hydrALAZINE 50 milliGRAM(s) Oral three times a day  insulin lispro (ADMELOG) corrective regimen sliding scale   SubCutaneous three times a day before meals  insulin lispro (ADMELOG) corrective regimen sliding scale   SubCutaneous at bedtime  melatonin 6 milliGRAM(s) Oral at bedtime  pantoprazole    Tablet 40 milliGRAM(s) Oral before breakfast  tamsulosin 0.4 milliGRAM(s) Oral at bedtime  ticagrelor 90 milliGRAM(s) Oral every 12 hours    09-20    139  |  108<H>  |  68<H>  ----------------------------<  107<H>  4.7   |  23  |  1.54<H>    Ca    8.8      20 Sep 2022 04:18  Phos  2.4     09-20  Mg     2.10     09-20    TPro  6.2  /  Alb  3.2<L>  /  TBili  0.3  /  DBili      /  AST  32  /  ALT  18  /  AlkPhos  35<L>  09-19    Creatinine Trend: 1.54 <--, 1.86 <--, 1.80 <--, 2.17 <--, 2.40 <--                        12.4   7.17  )-----------( 86       ( 20 Sep 2022 04:18 )             36.9

## 2022-09-21 LAB
ANION GAP SERPL CALC-SCNC: 11 MMOL/L — SIGNIFICANT CHANGE UP (ref 7–14)
APPEARANCE UR: ABNORMAL
APTT BLD: 30.4 SEC — SIGNIFICANT CHANGE UP (ref 27–36.3)
BACTERIA # UR AUTO: NEGATIVE — SIGNIFICANT CHANGE UP
BILIRUB UR-MCNC: NEGATIVE — SIGNIFICANT CHANGE UP
BUN SERPL-MCNC: 46 MG/DL — HIGH (ref 7–23)
CALCIUM SERPL-MCNC: 9.2 MG/DL — SIGNIFICANT CHANGE UP (ref 8.4–10.5)
CHLORIDE SERPL-SCNC: 103 MMOL/L — SIGNIFICANT CHANGE UP (ref 98–107)
CO2 SERPL-SCNC: 26 MMOL/L — SIGNIFICANT CHANGE UP (ref 22–31)
COLOR SPEC: ABNORMAL
CREAT SERPL-MCNC: 1.42 MG/DL — HIGH (ref 0.5–1.3)
DIFF PNL FLD: ABNORMAL
EGFR: 54 ML/MIN/1.73M2 — LOW
EPI CELLS # UR: 2 /HPF — SIGNIFICANT CHANGE UP (ref 0–5)
GLUCOSE BLDC GLUCOMTR-MCNC: 102 MG/DL — HIGH (ref 70–99)
GLUCOSE BLDC GLUCOMTR-MCNC: 112 MG/DL — HIGH (ref 70–99)
GLUCOSE BLDC GLUCOMTR-MCNC: 93 MG/DL — SIGNIFICANT CHANGE UP (ref 70–99)
GLUCOSE SERPL-MCNC: 103 MG/DL — HIGH (ref 70–99)
GLUCOSE UR QL: NEGATIVE — SIGNIFICANT CHANGE UP
HCT VFR BLD CALC: 39.1 % — SIGNIFICANT CHANGE UP (ref 39–50)
HGB BLD-MCNC: 12.8 G/DL — LOW (ref 13–17)
HYALINE CASTS # UR AUTO: 1 /LPF — SIGNIFICANT CHANGE UP (ref 0–7)
KETONES UR-MCNC: NEGATIVE — SIGNIFICANT CHANGE UP
LEUKOCYTE ESTERASE UR-ACNC: ABNORMAL
MAGNESIUM SERPL-MCNC: 1.7 MG/DL — SIGNIFICANT CHANGE UP (ref 1.6–2.6)
MCHC RBC-ENTMCNC: 30.8 PG — SIGNIFICANT CHANGE UP (ref 27–34)
MCHC RBC-ENTMCNC: 32.7 GM/DL — SIGNIFICANT CHANGE UP (ref 32–36)
MCV RBC AUTO: 94 FL — SIGNIFICANT CHANGE UP (ref 80–100)
NITRITE UR-MCNC: NEGATIVE — SIGNIFICANT CHANGE UP
NRBC # BLD: 0 /100 WBCS — SIGNIFICANT CHANGE UP (ref 0–0)
NRBC # FLD: 0 K/UL — SIGNIFICANT CHANGE UP (ref 0–0)
PH UR: 6 — SIGNIFICANT CHANGE UP (ref 5–8)
PHOSPHATE SERPL-MCNC: 3.1 MG/DL — SIGNIFICANT CHANGE UP (ref 2.5–4.5)
PLATELET # BLD AUTO: 69 K/UL — LOW (ref 150–400)
POTASSIUM SERPL-MCNC: 4.4 MMOL/L — SIGNIFICANT CHANGE UP (ref 3.5–5.3)
POTASSIUM SERPL-SCNC: 4.4 MMOL/L — SIGNIFICANT CHANGE UP (ref 3.5–5.3)
PROT UR-MCNC: ABNORMAL
RBC # BLD: 4.16 M/UL — LOW (ref 4.2–5.8)
RBC # FLD: 13.3 % — SIGNIFICANT CHANGE UP (ref 10.3–14.5)
RBC CASTS # UR COMP ASSIST: 467 /HPF — HIGH (ref 0–4)
SODIUM SERPL-SCNC: 140 MMOL/L — SIGNIFICANT CHANGE UP (ref 135–145)
SP GR SPEC: 1.01 — LOW (ref 1.01–1.05)
UROBILINOGEN FLD QL: SIGNIFICANT CHANGE UP
WBC # BLD: 10.54 K/UL — HIGH (ref 3.8–10.5)
WBC # FLD AUTO: 10.54 K/UL — HIGH (ref 3.8–10.5)
WBC UR QL: 37 /HPF — HIGH (ref 0–5)

## 2022-09-21 RX ORDER — CEFTRIAXONE 500 MG/1
INJECTION, POWDER, FOR SOLUTION INTRAMUSCULAR; INTRAVENOUS
Refills: 0 | Status: DISCONTINUED | OUTPATIENT
Start: 2022-09-21 | End: 2022-09-22

## 2022-09-21 RX ORDER — CEFTRIAXONE 500 MG/1
1000 INJECTION, POWDER, FOR SOLUTION INTRAMUSCULAR; INTRAVENOUS ONCE
Refills: 0 | Status: COMPLETED | OUTPATIENT
Start: 2022-09-21 | End: 2022-09-21

## 2022-09-21 RX ORDER — ZOLPIDEM TARTRATE 10 MG/1
5 TABLET ORAL AT BEDTIME
Refills: 0 | Status: DISCONTINUED | OUTPATIENT
Start: 2022-09-21 | End: 2022-09-21

## 2022-09-21 RX ORDER — CEFTRIAXONE 500 MG/1
1000 INJECTION, POWDER, FOR SOLUTION INTRAMUSCULAR; INTRAVENOUS EVERY 24 HOURS
Refills: 0 | Status: DISCONTINUED | OUTPATIENT
Start: 2022-09-22 | End: 2022-09-22

## 2022-09-21 RX ORDER — FUROSEMIDE 40 MG
40 TABLET ORAL DAILY
Refills: 0 | Status: DISCONTINUED | OUTPATIENT
Start: 2022-09-22 | End: 2022-09-23

## 2022-09-21 RX ADMIN — Medication 0: at 12:39

## 2022-09-21 RX ADMIN — PANTOPRAZOLE SODIUM 40 MILLIGRAM(S): 20 TABLET, DELAYED RELEASE ORAL at 05:14

## 2022-09-21 RX ADMIN — ATORVASTATIN CALCIUM 80 MILLIGRAM(S): 80 TABLET, FILM COATED ORAL at 22:16

## 2022-09-21 RX ADMIN — Medication 50 MILLIGRAM(S): at 05:14

## 2022-09-21 RX ADMIN — ENOXAPARIN SODIUM 40 MILLIGRAM(S): 100 INJECTION SUBCUTANEOUS at 17:55

## 2022-09-21 RX ADMIN — Medication 6 MILLIGRAM(S): at 21:57

## 2022-09-21 RX ADMIN — TICAGRELOR 90 MILLIGRAM(S): 90 TABLET ORAL at 18:00

## 2022-09-21 RX ADMIN — Medication 1000 UNIT(S): at 12:10

## 2022-09-21 RX ADMIN — Medication 50 MILLIGRAM(S): at 21:57

## 2022-09-21 RX ADMIN — Medication 40 MILLIGRAM(S): at 05:15

## 2022-09-21 RX ADMIN — Medication 81 MILLIGRAM(S): at 12:10

## 2022-09-21 RX ADMIN — FINASTERIDE 5 MILLIGRAM(S): 5 TABLET, FILM COATED ORAL at 12:09

## 2022-09-21 RX ADMIN — TAMSULOSIN HYDROCHLORIDE 0.4 MILLIGRAM(S): 0.4 CAPSULE ORAL at 21:57

## 2022-09-21 RX ADMIN — ZOLPIDEM TARTRATE 5 MILLIGRAM(S): 10 TABLET ORAL at 23:08

## 2022-09-21 RX ADMIN — CEFTRIAXONE 1000 MILLIGRAM(S): 500 INJECTION, POWDER, FOR SOLUTION INTRAMUSCULAR; INTRAVENOUS at 12:09

## 2022-09-21 RX ADMIN — TICAGRELOR 90 MILLIGRAM(S): 90 TABLET ORAL at 05:14

## 2022-09-21 RX ADMIN — Medication 50 MILLIGRAM(S): at 13:35

## 2022-09-21 NOTE — PROGRESS NOTE ADULT - SUBJECTIVE AND OBJECTIVE BOX
Patient is a 66y old  Male who presents with a chief complaint of SOB (21 Sep 2022 10:54)      INTERVAL HPI/OVERNIGHT EVENTS:  T(C): 36.6 (22 @ 17:18), Max: 36.8 (22 @ 05:10)  HR: 81 (22 @ 17:18) (72 - 81)  BP: 144/67 (22 @ 17:18) (144/67 - 156/67)  RR: 20 (22 @ 17:18) (18 - 20)  SpO2: 100% (22 @ 17:18) (95% - 100%)  Wt(kg): --  I&O's Summary    20 Sep 2022 07:  -  21 Sep 2022 07:00  --------------------------------------------------------  IN: 320 mL / OUT: 5600 mL / NET: -5280 mL    21 Sep 2022 07:01  -  21 Sep 2022 22:07  --------------------------------------------------------  IN: 0 mL / OUT: 1450 mL / NET: -1450 mL        PAST MEDICAL & SURGICAL HISTORY:  Heart failure      Stage 3 chronic kidney disease      HTN (hypertension)      Prediabetes      CAD (coronary artery disease)      S/P CABG x 1          SOCIAL HISTORY  Alcohol:  Tobacco:  Illicit substance use:    FAMILY HISTORY:    REVIEW OF SYSTEMS:  CONSTITUTIONAL: No fever, weight loss, or fatigue  EYES: No eye pain, visual disturbances, or discharge  ENMT:  No difficulty hearing, tinnitus, vertigo; No sinus or throat pain  NECK: No pain or stiffness  RESPIRATORY: No cough, wheezing, chills or hemoptysis; No shortness of breath  CARDIOVASCULAR: No chest pain, palpitations, dizziness, or leg swelling  GASTROINTESTINAL: No abdominal or epigastric pain. No nausea, vomiting, or hematemesis; No diarrhea or constipation. No melena or hematochezia.  GENITOURINARY: No dysuria, frequency, hematuria, or incontinence  NEUROLOGICAL: No headaches, memory loss, loss of strength, numbness, or tremors  SKIN: No itching, burning, rashes, or lesions   LYMPH NODES: No enlarged glands  ENDOCRINE: No heat or cold intolerance; No hair loss  MUSCULOSKELETAL: No joint pain or swelling; No muscle, back, or extremity pain  PSYCHIATRIC: No depression, anxiety, mood swings, or difficulty sleeping  HEME/LYMPH: No easy bruising, or bleeding gums  ALLERY AND IMMUNOLOGIC: No hives or eczema    RADIOLOGY & ADDITIONAL TESTS:    Imaging Personally Reviewed:  [ ] YES  [ ] NO    Consultant(s) Notes Reviewed:  [ ] YES  [ ] NO    PHYSICAL EXAM:  GENERAL: NAD, well-groomed, well-developed  HEAD:  Atraumatic, Normocephalic  EYES: EOMI, PERRLA, conjunctiva and sclera clear  ENMT: No tonsillar erythema, exudates, or enlargement; Moist mucous membranes, Good dentition, No lesions  NECK: Supple, No JVD, Normal thyroid  NERVOUS SYSTEM:  Alert & Oriented X3, Good concentration; Motor Strength 5/5 B/L upper and lower extremities; DTRs 2+ intact and symmetric  CHEST/LUNG: Clear to percussion bilaterally; No rales, rhonchi, wheezing, or rubs  HEART: Regular rate and rhythm; No murmurs, rubs, or gallops  ABDOMEN: Soft, Nontender, Nondistended; Bowel sounds present  EXTREMITIES:  2+ Peripheral Pulses, No clubbing, cyanosis, or edema  LYMPH: No lymphadenopathy noted  SKIN: No rashes or lesions    LABS:                        12.8   10.54 )-----------( 69       ( 21 Sep 2022 06:40 )             39.1         140  |  103  |  46<H>  ----------------------------<  103<H>  4.4   |  26  |  1.42<H>    Ca    9.2      21 Sep 2022 06:40  Phos  3.1       Mg     1.70     -      PTT - ( 21 Sep 2022 06:40 )  PTT:30.4 sec  Urinalysis Basic - ( 21 Sep 2022 06:40 )    Color: Light Brown / Appearance: Slightly Turbid / S.009 / pH: x  Gluc: x / Ketone: Negative  / Bili: Negative / Urobili: <2 mg/dL   Blood: x / Protein: Trace / Nitrite: Negative   Leuk Esterase: Large / RBC: 467 /HPF / WBC 37 /HPF   Sq Epi: x / Non Sq Epi: 2 /HPF / Bacteria: Negative      CAPILLARY BLOOD GLUCOSE      POCT Blood Glucose.: 102 mg/dL (21 Sep 2022 18:06)  POCT Blood Glucose.: 93 mg/dL (21 Sep 2022 12:35)  POCT Blood Glucose.: 112 mg/dL (21 Sep 2022 09:02)        Urinalysis Basic - ( 21 Sep 2022 06:40 )    Color: Light Brown / Appearance: Slightly Turbid / S.009 / pH: x  Gluc: x / Ketone: Negative  / Bili: Negative / Urobili: <2 mg/dL   Blood: x / Protein: Trace / Nitrite: Negative   Leuk Esterase: Large / RBC: 467 /HPF / WBC 37 /HPF   Sq Epi: x / Non Sq Epi: 2 /HPF / Bacteria: Negative        MEDICATIONS  (STANDING):  aspirin  chewable 81 milliGRAM(s) Oral daily  atorvastatin 80 milliGRAM(s) Oral at bedtime  cefTRIAXone   IVPB      cholecalciferol 1000 Unit(s) Oral daily  dextrose 5%. 1000 milliLiter(s) (50 mL/Hr) IV Continuous <Continuous>  dextrose 5%. 1000 milliLiter(s) (100 mL/Hr) IV Continuous <Continuous>  dextrose 50% Injectable 25 Gram(s) IV Push once  dextrose 50% Injectable 12.5 Gram(s) IV Push once  dextrose 50% Injectable 25 Gram(s) IV Push once  enoxaparin Injectable 40 milliGRAM(s) SubCutaneous every 24 hours  finasteride 5 milliGRAM(s) Oral daily  glucagon  Injectable 1 milliGRAM(s) IntraMuscular once  hydrALAZINE 50 milliGRAM(s) Oral three times a day  insulin lispro (ADMELOG) corrective regimen sliding scale   SubCutaneous three times a day before meals  insulin lispro (ADMELOG) corrective regimen sliding scale   SubCutaneous at bedtime  melatonin 6 milliGRAM(s) Oral at bedtime  pantoprazole    Tablet 40 milliGRAM(s) Oral before breakfast  tamsulosin 0.4 milliGRAM(s) Oral at bedtime  ticagrelor 90 milliGRAM(s) Oral every 12 hours    MEDICATIONS  (PRN):  dextrose Oral Gel 15 Gram(s) Oral once PRN Blood Glucose LESS THAN 70 milliGRAM(s)/deciliter  lidocaine 2% Gel 1 Application(s) Topical two times a day PRN pain at vila insertion site  nicotine  Polacrilex Lozenge 4 milliGRAM(s) Oral every 4 hours PRN smoking cessation      Care Discussed with Consultants/Other Providers [ ] YES  [ ] NO Patient is a 66y old  Male who presents with a chief complaint of SOB (21 Sep 2022 10:54)      INTERVAL HPI/OVERNIGHT EVENTS: feeling ok , denies any SOB , "want to go home "  T(C): 36.6 (22 @ 17:18), Max: 36.8 (22 @ 05:10)  HR: 81 (22 @ 17:18) (72 - 81)  BP: 144/67 (22 @ 17:18) (144/67 - 156/67)  RR: 20 (22 @ 17:18) (18 - 20)  SpO2: 100% (22 @ 17:18) (95% - 100%)  Wt(kg): --  I&O's Summary    20 Sep 2022 07:01  -  21 Sep 2022 07:00  --------------------------------------------------------  IN: 320 mL / OUT: 5600 mL / NET: -5280 mL    21 Sep 2022 07:01  -  21 Sep 2022 22:07  --------------------------------------------------------  IN: 0 mL / OUT: 1450 mL / NET: -1450 mL        PAST MEDICAL & SURGICAL HISTORY:  Heart failure      Stage 3 chronic kidney disease      HTN (hypertension)      Prediabetes      CAD (coronary artery disease)      S/P CABG x 1          SOCIAL HISTORY  Alcohol:  Tobacco:  Illicit substance use:    FAMILY HISTORY:    REVIEW OF SYSTEMS:  CONSTITUTIONAL: No fever, weight loss, or fatigue  EYES: No eye pain, visual disturbances, or discharge  ENMT:  No difficulty hearing, tinnitus, vertigo; No sinus or throat pain  NECK: No pain or stiffness  RESPIRATORY: No cough, wheezing, chills or hemoptysis; No shortness of breath  CARDIOVASCULAR: No chest pain, palpitations, dizziness, or leg swelling  GASTROINTESTINAL: No abdominal or epigastric pain. No nausea, vomiting, or hematemesis; No diarrhea or constipation. No melena or hematochezia.  GENITOURINARY: No dysuria, frequency, hematuria, or incontinence  NEUROLOGICAL: No headaches, memory loss, loss of strength, numbness, or tremors  SKIN: No itching, burning, rashes, or lesions   LYMPH NODES: No enlarged glands  ENDOCRINE: No heat or cold intolerance; No hair loss  MUSCULOSKELETAL: No joint pain or swelling; No muscle, back, or extremity pain  PSYCHIATRIC: No depression, anxiety, mood swings, or difficulty sleeping  HEME/LYMPH: No easy bruising, or bleeding gums  ALLERY AND IMMUNOLOGIC: No hives or eczema    RADIOLOGY & ADDITIONAL TESTS:    Imaging Personally Reviewed:  [ ] YES  [ ] NO    Consultant(s) Notes Reviewed:  [ ] YES  [ ] NO    PHYSICAL EXAM:  GENERAL: NAD, well-groomed, well-developed  HEAD:  Atraumatic, Normocephalic  EYES: EOMI, PERRLA, conjunctiva and sclera clear  ENMT: No tonsillar erythema, exudates, or enlargement; Moist mucous membranes, Good dentition, No lesions  NECK: Supple, No JVD, Normal thyroid  NERVOUS SYSTEM:  Alert & Oriented X3, Good concentration; Motor Strength 5/5 B/L upper and lower extremities; DTRs 2+ intact and symmetric  CHEST/LUNG: Clear to percussion bilaterally; No rales, rhonchi, wheezing, or rubs  HEART: Regular rate and rhythm; No murmurs, rubs, or gallops  ABDOMEN: Soft, Nontender, Nondistended; Bowel sounds present  EXTREMITIES:  2+ Peripheral Pulses, No clubbing, cyanosis, or edema  LYMPH: No lymphadenopathy noted  SKIN: No rashes or lesions    LABS:                        12.8   10.54 )-----------( 69       ( 21 Sep 2022 06:40 )             39.1     -    140  |  103  |  46<H>  ----------------------------<  103<H>  4.4   |  26  |  1.42<H>    Ca    9.2      21 Sep 2022 06:40  Phos  3.1     -  Mg     1.70     -      PTT - ( 21 Sep 2022 06:40 )  PTT:30.4 sec  Urinalysis Basic - ( 21 Sep 2022 06:40 )    Color: Light Brown / Appearance: Slightly Turbid / S.009 / pH: x  Gluc: x / Ketone: Negative  / Bili: Negative / Urobili: <2 mg/dL   Blood: x / Protein: Trace / Nitrite: Negative   Leuk Esterase: Large / RBC: 467 /HPF / WBC 37 /HPF   Sq Epi: x / Non Sq Epi: 2 /HPF / Bacteria: Negative      CAPILLARY BLOOD GLUCOSE      POCT Blood Glucose.: 102 mg/dL (21 Sep 2022 18:06)  POCT Blood Glucose.: 93 mg/dL (21 Sep 2022 12:35)  POCT Blood Glucose.: 112 mg/dL (21 Sep 2022 09:02)        Urinalysis Basic - ( 21 Sep 2022 06:40 )    Color: Light Brown / Appearance: Slightly Turbid / S.009 / pH: x  Gluc: x / Ketone: Negative  / Bili: Negative / Urobili: <2 mg/dL   Blood: x / Protein: Trace / Nitrite: Negative   Leuk Esterase: Large / RBC: 467 /HPF / WBC 37 /HPF   Sq Epi: x / Non Sq Epi: 2 /HPF / Bacteria: Negative        MEDICATIONS  (STANDING):  aspirin  chewable 81 milliGRAM(s) Oral daily  atorvastatin 80 milliGRAM(s) Oral at bedtime  cefTRIAXone   IVPB      cholecalciferol 1000 Unit(s) Oral daily  dextrose 5%. 1000 milliLiter(s) (50 mL/Hr) IV Continuous <Continuous>  dextrose 5%. 1000 milliLiter(s) (100 mL/Hr) IV Continuous <Continuous>  dextrose 50% Injectable 25 Gram(s) IV Push once  dextrose 50% Injectable 12.5 Gram(s) IV Push once  dextrose 50% Injectable 25 Gram(s) IV Push once  enoxaparin Injectable 40 milliGRAM(s) SubCutaneous every 24 hours  finasteride 5 milliGRAM(s) Oral daily  glucagon  Injectable 1 milliGRAM(s) IntraMuscular once  hydrALAZINE 50 milliGRAM(s) Oral three times a day  insulin lispro (ADMELOG) corrective regimen sliding scale   SubCutaneous three times a day before meals  insulin lispro (ADMELOG) corrective regimen sliding scale   SubCutaneous at bedtime  melatonin 6 milliGRAM(s) Oral at bedtime  pantoprazole    Tablet 40 milliGRAM(s) Oral before breakfast  tamsulosin 0.4 milliGRAM(s) Oral at bedtime  ticagrelor 90 milliGRAM(s) Oral every 12 hours    MEDICATIONS  (PRN):  dextrose Oral Gel 15 Gram(s) Oral once PRN Blood Glucose LESS THAN 70 milliGRAM(s)/deciliter  lidocaine 2% Gel 1 Application(s) Topical two times a day PRN pain at vila insertion site  nicotine  Polacrilex Lozenge 4 milliGRAM(s) Oral every 4 hours PRN smoking cessation      Care Discussed with Consultants/Other Providers [ ] YES  [ ] NO

## 2022-09-21 NOTE — CONSULT NOTE ADULT - SUBJECTIVE AND OBJECTIVE BOX
CARDIOLOGY CONSULT - Dr. Rao  Date of Service: 9/21/2022      HPI:  65yo Male with h/o CKD3, CHF, CAD s/p CABG (2007), HTN, pre-DM, current smoker, recent COVID-19 (tested positive 9/8) c/o severe SOB affecting ambulation and weight gain of 10lb in 3 weeks.Pt presented here with similar sxs yesterday and was a planned admission for NSTEMI and COVID exacerbation, given 80mg IV lasix, however pt left AMA while awaiting for a bed. Today pt states he had worsening SOB so represented to ED. Pt states that his breathing has not felt the same since his recent admission to [facility] for COVID 10 days ago, was treated on bipap, remdesivir/dexamethasone x5d. He endorses productive cough with whitish phlegm and swelling in his feet and abdomen. Reports no associated palpitations orthopnea, sleeps on two thin pillows regularly, but states he wakes up several times at night. Reports no fevers/chills, CP, abdominal pain, changes in BMs, or dysuria.     ED course: HDS, BPs in 140s, sat 98% on RA.  Seen by cardiology; ASA, brillinta load, heparin gtt, diuresis started; (18 Sep 2022 20:21)      PAST MEDICAL & SURGICAL HISTORY:  Heart failure      Stage 3 chronic kidney disease      HTN (hypertension)      Prediabetes      CAD (coronary artery disease)      S/P CABG x 1              PREVIOUS DIAGNOSTIC TESTING:    [ ] Echocardiogram:  [ ]  Catheterization:  [ ] Stress Test:  	    MEDICATIONS:  MEDICATIONS  (STANDING):  aspirin  chewable 81 milliGRAM(s) Oral daily  atorvastatin 80 milliGRAM(s) Oral at bedtime  cefTRIAXone   IVPB      cefTRIAXone   IVPB 1000 milliGRAM(s) IV Intermittent once  cholecalciferol 1000 Unit(s) Oral daily  dextrose 5%. 1000 milliLiter(s) (100 mL/Hr) IV Continuous <Continuous>  dextrose 5%. 1000 milliLiter(s) (50 mL/Hr) IV Continuous <Continuous>  dextrose 50% Injectable 25 Gram(s) IV Push once  dextrose 50% Injectable 12.5 Gram(s) IV Push once  dextrose 50% Injectable 25 Gram(s) IV Push once  enoxaparin Injectable 40 milliGRAM(s) SubCutaneous every 24 hours  finasteride 5 milliGRAM(s) Oral daily  furosemide   Injectable 40 milliGRAM(s) IV Push two times a day  glucagon  Injectable 1 milliGRAM(s) IntraMuscular once  hydrALAZINE 50 milliGRAM(s) Oral three times a day  insulin lispro (ADMELOG) corrective regimen sliding scale   SubCutaneous three times a day before meals  insulin lispro (ADMELOG) corrective regimen sliding scale   SubCutaneous at bedtime  melatonin 6 milliGRAM(s) Oral at bedtime  pantoprazole    Tablet 40 milliGRAM(s) Oral before breakfast  tamsulosin 0.4 milliGRAM(s) Oral at bedtime  ticagrelor 90 milliGRAM(s) Oral every 12 hours      FAMILY HISTORY:  No pertinent family history in first degree relatives        SOCIAL HISTORY:    [ ] Non-smoker  [ ] Smoker  [ ] Alcohol    Allergies    No Known Allergies    Intolerances    	    REVIEW OF SYSTEMS:  CONSTITUTIONAL: No fever, weight loss, or fatigue  EYES: No eye pain, visual disturbances, or discharge  ENMT:  No difficulty hearing, tinnitus, vertigo; No sinus or throat pain  NECK: No pain or stiffness  RESPIRATORY: No cough, wheezing, chills or hemoptysis; No Shortness of Breath  CARDIOVASCULAR: No chest pain, palpitations, passing out, dizziness, or leg swelling  GASTROINTESTINAL: No abdominal or epigastric pain. No nausea, vomiting, or hematemesis; No diarrhea or constipation. No melena or hematochezia.  GENITOURINARY: No dysuria, frequency, hematuria, or incontinence  NEUROLOGICAL: No headaches, memory loss, loss of strength, numbness, or tremors  SKIN: No itching, burning, rashes, or lesions   	    [ ] All others negative	  [ ] Unable to obtain    PHYSICAL EXAM:  T(C): 36.8 (09-21-22 @ 05:10), Max: 36.8 (09-20-22 @ 10:57)  HR: 79 (09-21-22 @ 05:10) (74 - 84)  BP: 148/60 (09-21-22 @ 05:10) (136/60 - 154/70)  RR: 18 (09-21-22 @ 05:10) (17 - 18)  SpO2: 98% (09-21-22 @ 05:10) (98% - 100%)  Wt(kg): --  I&O's Summary    20 Sep 2022 07:01  -  21 Sep 2022 07:00  --------------------------------------------------------  IN: 320 mL / OUT: 5600 mL / NET: -5280 mL        Appearance: Normal	  Psychiatry: A & O x 3, Mood & affect appropriate  HEENT:   Normal oral mucosa, PERRL, EOMI	  Lymphatic: No lymphadenopathy  Cardiovascular: Normal S1 S2,RRR, No JVD, No murmurs  Respiratory: Lungs clear to auscultation	  Gastrointestinal:  Soft, Non-tender, + BS	  Skin: No rashes, No ecchymoses, No cyanosis	  Neurologic: Non-focal  Extremities: Normal range of motion, No clubbing, cyanosis or edema  Vascular: Peripheral pulses palpable 2+ bilaterally    TELEMETRY: 	    ECG:  	  RADIOLOGY:  OTHER: 	  	  LABS:	 	    CARDIAC MARKERS:                                  12.8   10.54 )-----------( 69       ( 21 Sep 2022 06:40 )             39.1     09-21    140  |  103  |  46<H>  ----------------------------<  103<H>  4.4   |  26  |  1.42<H>    Ca    9.2      21 Sep 2022 06:40  Phos  3.1     09-21  Mg     1.70     09-21      PTT - ( 21 Sep 2022 06:40 )  PTT:30.4 sec  proBNP:   Lipid Profile:   HgA1c:   TSH:     ASSESSMENT/PLAN: 	              70 minutes spent on total encounter; more than 50% of the visit was spent counseling and/or coordinating care by the attending physician.

## 2022-09-21 NOTE — PROGRESS NOTE ADULT - SUBJECTIVE AND OBJECTIVE BOX
[facility] - LI Aguilar / LI Trujillo / KASSY Mustafa/ LI Parra/ LI Collins/ CARMENCITA Allison / ANIA Escobedo / CHASTITY Bustamante  ---------------------------------------------------------------------------------------------------------------  seen and examined today for ALTAGRACIA  Interval : serum creatinine improving  VITALS:  T(F): 98.3 (09-21-22 @ 05:10), Max: 98.3 (09-21-22 @ 05:10)  HR: 79 (09-21-22 @ 05:10)  BP: 148/60 (09-21-22 @ 05:10)  RR: 18 (09-21-22 @ 05:10)  SpO2: 98% (09-21-22 @ 05:10)  Wt(kg): --    09-20 @ 07:01  -  09-21 @ 07:00  --------------------------------------------------------  IN: 320 mL / OUT: 5600 mL / NET: -5280 mL      Physical Exam :-  Constitutional: NAD  Neck: Supple.  Respiratory: Bilateral equal breath sounds,  Cardiovascular: S1, S2 normal,  Gastrointestinal: Bowel Sounds present, soft, non tender.  Extremities: No edema  Neurological: Alert and Oriented x 3, no focal deficits  Psychiatric: Normal mood, normal affect  Data:-  Allergies :   No Known Allergies    Hospital Medications:   MEDICATIONS  (STANDING):  aspirin  chewable 81 milliGRAM(s) Oral daily  atorvastatin 80 milliGRAM(s) Oral at bedtime  cefTRIAXone   IVPB      cefTRIAXone   IVPB 1000 milliGRAM(s) IV Intermittent once  cholecalciferol 1000 Unit(s) Oral daily  dextrose 5%. 1000 milliLiter(s) (50 mL/Hr) IV Continuous <Continuous>  dextrose 5%. 1000 milliLiter(s) (100 mL/Hr) IV Continuous <Continuous>  dextrose 50% Injectable 25 Gram(s) IV Push once  dextrose 50% Injectable 12.5 Gram(s) IV Push once  dextrose 50% Injectable 25 Gram(s) IV Push once  enoxaparin Injectable 40 milliGRAM(s) SubCutaneous every 24 hours  finasteride 5 milliGRAM(s) Oral daily  furosemide   Injectable 40 milliGRAM(s) IV Push two times a day  glucagon  Injectable 1 milliGRAM(s) IntraMuscular once  hydrALAZINE 50 milliGRAM(s) Oral three times a day  insulin lispro (ADMELOG) corrective regimen sliding scale   SubCutaneous three times a day before meals  insulin lispro (ADMELOG) corrective regimen sliding scale   SubCutaneous at bedtime  melatonin 6 milliGRAM(s) Oral at bedtime  pantoprazole    Tablet 40 milliGRAM(s) Oral before breakfast  tamsulosin 0.4 milliGRAM(s) Oral at bedtime  ticagrelor 90 milliGRAM(s) Oral every 12 hours    09-21    140  |  103  |  46<H>  ----------------------------<  103<H>  4.4   |  26  |  1.42<H>    Ca    9.2      21 Sep 2022 06:40  Phos  3.1     09-21  Mg     1.70     09-21      Creatinine Trend: 1.42 <--, 1.54 <--, 1.86 <--, 1.80 <--, 2.17 <--, 2.40 <--                        12.8   10.54 )-----------( 69       ( 21 Sep 2022 06:40 )             39.1

## 2022-09-21 NOTE — CONSULT NOTE ADULT - ASSESSMENT
67yo Male with h/o CKD3, CHF, CAD s/p CABG (2007), HTN, pre-DM, current smoker, recent COVID-19 (tested positive 9/8) c/o severe SOB affecting ambulation and weight gain of 10lb in 3 weeks    #Acute/Chronic HFrEF  -in setting of a obstructive uropathy  -ECHO reveals moderate LV dysfunction and moderate AS  -pt appears euvolemic  -switch to 40mg PO lasix  -elevated troponin on admission in setting of ALTAGRACIA, advanced CAD and decompensated CHF likely more representative of demand  -pt with no cp or change in exercise tolerance  -reports similar hospitalization last year for similar complaints s/p cath revealing no disease  -will defer cath for now given asymptomatic status, ckd, mild hematuria and conjuctival hemorrhage in left eye  -pt will followup with his cardio on DC    #CAD s/p CABG  -continue current mgmt  -defer cath for now    #CKD  -renal followup    #Obstructive Uropathy  -s/p vila  -urology followup    No objection to DC with outpt followup w cardio in 1-2 weeks     minutes spent on total encounter; more than 50% of the visit was spent counseling and/or coordinating care by the attending physician.

## 2022-09-22 LAB
ANION GAP SERPL CALC-SCNC: 13 MMOL/L — SIGNIFICANT CHANGE UP (ref 7–14)
BASOPHILS # BLD AUTO: 0.04 K/UL — SIGNIFICANT CHANGE UP (ref 0–0.2)
BASOPHILS NFR BLD AUTO: 0.5 % — SIGNIFICANT CHANGE UP (ref 0–2)
BUN SERPL-MCNC: 37 MG/DL — HIGH (ref 7–23)
CALCIUM SERPL-MCNC: 9.1 MG/DL — SIGNIFICANT CHANGE UP (ref 8.4–10.5)
CHLORIDE SERPL-SCNC: 99 MMOL/L — SIGNIFICANT CHANGE UP (ref 98–107)
CO2 SERPL-SCNC: 27 MMOL/L — SIGNIFICANT CHANGE UP (ref 22–31)
CREAT SERPL-MCNC: 1.29 MG/DL — SIGNIFICANT CHANGE UP (ref 0.5–1.3)
CULTURE RESULTS: NO GROWTH — SIGNIFICANT CHANGE UP
EGFR: 61 ML/MIN/1.73M2 — SIGNIFICANT CHANGE UP
EOSINOPHIL # BLD AUTO: 0.12 K/UL — SIGNIFICANT CHANGE UP (ref 0–0.5)
EOSINOPHIL NFR BLD AUTO: 1.4 % — SIGNIFICANT CHANGE UP (ref 0–6)
GLUCOSE BLDC GLUCOMTR-MCNC: 106 MG/DL — HIGH (ref 70–99)
GLUCOSE BLDC GLUCOMTR-MCNC: 127 MG/DL — HIGH (ref 70–99)
GLUCOSE BLDC GLUCOMTR-MCNC: 144 MG/DL — HIGH (ref 70–99)
GLUCOSE BLDC GLUCOMTR-MCNC: 170 MG/DL — HIGH (ref 70–99)
GLUCOSE SERPL-MCNC: 87 MG/DL — SIGNIFICANT CHANGE UP (ref 70–99)
HCT VFR BLD CALC: 38.9 % — LOW (ref 39–50)
HGB BLD-MCNC: 13 G/DL — SIGNIFICANT CHANGE UP (ref 13–17)
IANC: 6.16 K/UL — SIGNIFICANT CHANGE UP (ref 1.8–7.4)
IMM GRANULOCYTES NFR BLD AUTO: 0.5 % — SIGNIFICANT CHANGE UP (ref 0–0.9)
LYMPHOCYTES # BLD AUTO: 1.02 K/UL — SIGNIFICANT CHANGE UP (ref 1–3.3)
LYMPHOCYTES # BLD AUTO: 11.9 % — LOW (ref 13–44)
MAGNESIUM SERPL-MCNC: 1.7 MG/DL — SIGNIFICANT CHANGE UP (ref 1.6–2.6)
MCHC RBC-ENTMCNC: 31 PG — SIGNIFICANT CHANGE UP (ref 27–34)
MCHC RBC-ENTMCNC: 33.4 GM/DL — SIGNIFICANT CHANGE UP (ref 32–36)
MCV RBC AUTO: 92.6 FL — SIGNIFICANT CHANGE UP (ref 80–100)
MONOCYTES # BLD AUTO: 1.19 K/UL — HIGH (ref 0–0.9)
MONOCYTES NFR BLD AUTO: 13.9 % — SIGNIFICANT CHANGE UP (ref 2–14)
NEUTROPHILS # BLD AUTO: 6.16 K/UL — SIGNIFICANT CHANGE UP (ref 1.8–7.4)
NEUTROPHILS NFR BLD AUTO: 71.8 % — SIGNIFICANT CHANGE UP (ref 43–77)
NRBC # BLD: 0 /100 WBCS — SIGNIFICANT CHANGE UP (ref 0–0)
NRBC # FLD: 0 K/UL — SIGNIFICANT CHANGE UP (ref 0–0)
PHOSPHATE SERPL-MCNC: 3.3 MG/DL — SIGNIFICANT CHANGE UP (ref 2.5–4.5)
PLATELET # BLD AUTO: 66 K/UL — LOW (ref 150–400)
POTASSIUM SERPL-MCNC: 4 MMOL/L — SIGNIFICANT CHANGE UP (ref 3.5–5.3)
POTASSIUM SERPL-SCNC: 4 MMOL/L — SIGNIFICANT CHANGE UP (ref 3.5–5.3)
RBC # BLD: 4.2 M/UL — SIGNIFICANT CHANGE UP (ref 4.2–5.8)
RBC # FLD: 13.3 % — SIGNIFICANT CHANGE UP (ref 10.3–14.5)
SODIUM SERPL-SCNC: 139 MMOL/L — SIGNIFICANT CHANGE UP (ref 135–145)
SPECIMEN SOURCE: SIGNIFICANT CHANGE UP
WBC # BLD: 8.57 K/UL — SIGNIFICANT CHANGE UP (ref 3.8–10.5)
WBC # FLD AUTO: 8.57 K/UL — SIGNIFICANT CHANGE UP (ref 3.8–10.5)

## 2022-09-22 RX ORDER — MAGNESIUM SULFATE 500 MG/ML
1 VIAL (ML) INJECTION ONCE
Refills: 0 | Status: COMPLETED | OUTPATIENT
Start: 2022-09-22 | End: 2022-09-22

## 2022-09-22 RX ORDER — ZOLPIDEM TARTRATE 10 MG/1
5 TABLET ORAL AT BEDTIME
Refills: 0 | Status: DISCONTINUED | OUTPATIENT
Start: 2022-09-22 | End: 2022-09-22

## 2022-09-22 RX ADMIN — Medication 50 MILLIGRAM(S): at 12:58

## 2022-09-22 RX ADMIN — TICAGRELOR 90 MILLIGRAM(S): 90 TABLET ORAL at 17:14

## 2022-09-22 RX ADMIN — ATORVASTATIN CALCIUM 80 MILLIGRAM(S): 80 TABLET, FILM COATED ORAL at 21:00

## 2022-09-22 RX ADMIN — Medication 50 MILLIGRAM(S): at 21:00

## 2022-09-22 RX ADMIN — TICAGRELOR 90 MILLIGRAM(S): 90 TABLET ORAL at 07:28

## 2022-09-22 RX ADMIN — TAMSULOSIN HYDROCHLORIDE 0.4 MILLIGRAM(S): 0.4 CAPSULE ORAL at 21:00

## 2022-09-22 RX ADMIN — PANTOPRAZOLE SODIUM 40 MILLIGRAM(S): 20 TABLET, DELAYED RELEASE ORAL at 06:42

## 2022-09-22 RX ADMIN — Medication 81 MILLIGRAM(S): at 12:57

## 2022-09-22 RX ADMIN — ZOLPIDEM TARTRATE 5 MILLIGRAM(S): 10 TABLET ORAL at 23:27

## 2022-09-22 RX ADMIN — Medication 6 MILLIGRAM(S): at 21:00

## 2022-09-22 RX ADMIN — Medication 100 GRAM(S): at 12:57

## 2022-09-22 RX ADMIN — FINASTERIDE 5 MILLIGRAM(S): 5 TABLET, FILM COATED ORAL at 12:57

## 2022-09-22 RX ADMIN — Medication 1000 UNIT(S): at 12:57

## 2022-09-22 RX ADMIN — Medication 40 MILLIGRAM(S): at 06:42

## 2022-09-22 RX ADMIN — Medication 50 MILLIGRAM(S): at 06:42

## 2022-09-22 RX ADMIN — ENOXAPARIN SODIUM 40 MILLIGRAM(S): 100 INJECTION SUBCUTANEOUS at 17:14

## 2022-09-22 RX ADMIN — CEFTRIAXONE 100 MILLIGRAM(S): 500 INJECTION, POWDER, FOR SOLUTION INTRAMUSCULAR; INTRAVENOUS at 07:30

## 2022-09-22 NOTE — DIETITIAN INITIAL EVALUATION ADULT - PERTINENT MEDS FT
MEDICATIONS  (STANDING):  aspirin  chewable 81 milliGRAM(s) Oral daily  atorvastatin 80 milliGRAM(s) Oral at bedtime  cefTRIAXone   IVPB      cefTRIAXone   IVPB 1000 milliGRAM(s) IV Intermittent every 24 hours  cholecalciferol 1000 Unit(s) Oral daily  dextrose 5%. 1000 milliLiter(s) (100 mL/Hr) IV Continuous <Continuous>  dextrose 5%. 1000 milliLiter(s) (50 mL/Hr) IV Continuous <Continuous>  dextrose 50% Injectable 25 Gram(s) IV Push once  dextrose 50% Injectable 12.5 Gram(s) IV Push once  dextrose 50% Injectable 25 Gram(s) IV Push once  enoxaparin Injectable 40 milliGRAM(s) SubCutaneous every 24 hours  finasteride 5 milliGRAM(s) Oral daily  furosemide    Tablet 40 milliGRAM(s) Oral daily  glucagon  Injectable 1 milliGRAM(s) IntraMuscular once  hydrALAZINE 50 milliGRAM(s) Oral three times a day  insulin lispro (ADMELOG) corrective regimen sliding scale   SubCutaneous three times a day before meals  insulin lispro (ADMELOG) corrective regimen sliding scale   SubCutaneous at bedtime  melatonin 6 milliGRAM(s) Oral at bedtime  pantoprazole    Tablet 40 milliGRAM(s) Oral before breakfast  tamsulosin 0.4 milliGRAM(s) Oral at bedtime  ticagrelor 90 milliGRAM(s) Oral every 12 hours    MEDICATIONS  (PRN):  dextrose Oral Gel 15 Gram(s) Oral once PRN Blood Glucose LESS THAN 70 milliGRAM(s)/deciliter  lidocaine 2% Gel 1 Application(s) Topical two times a day PRN pain at vila insertion site  nicotine  Polacrilex Lozenge 4 milliGRAM(s) Oral every 4 hours PRN smoking cessation

## 2022-09-22 NOTE — CHART NOTE - NSCHARTNOTEFT_GEN_A_CORE
Pt requesting TOV - He prefers to go home without the vila. Spoke with Urology who agrees with TOV tonight. Patient also had hematuria from the vila. UA was done that was slightly positive for UTI however urine culture came back today negative and patient does not have any urinary symptoms therefore CTX SC'ed. Discussed plan with Dr. Clemens.

## 2022-09-22 NOTE — PROGRESS NOTE ADULT - ASSESSMENT
65yo Male, current smoker, with h/o CKD3, CHF, CAD s/p CABG (2007), HTN, pre-DM, current smoker, recent COVID-19 (tested positive 9/8) c/o  a/w NSTEMI and acute on chronic likely HFrEF; Renal consulted for ALTAGRACIA on CKD Mx.   	  ALTAGRACIA on CKD3  ALTAGRACIA improving  Creatinine Trend: 1.54 <-- 1.86 <--, 1.80 <--, 2.17 <--, 2.40 <--  CKD by history. unknown b/l Cr  clinically hypervolemic  K, bicarb ok  UOP much imrpoved S/P Harper  will need Urology follow up    -agree w/ lasix IV 40mg BID  -holding home entresto given ALTAGRACIA  -agree w/holding colchicine   -c/w flomax given history of BPH  -check bladder scan x1 to r/o urinary retention  low Na diet, fluid restriction 1L/day  monitor  BMP daily and u/o   dose all meds for eGFR  avoid ACEi/ARB for now/NSAIDs/Nephrotoxics.    h/o CAD and CABG w/NSTEMI (non-ST elevation myocardial infarction).   -s/p ASA, brillinta load  -started on hep gtt ACS  patient says he had a cardiac cath 1 year ago and no obstruction with same symptoms  if planned for cath, advised to premedicate with mucomyst 1200mg PO x 4 doses, 2 pre- 2 post-procedure 12hrs apart      For any question, call:  Cell # [phone]  Pager # [phone]  Callback # [phone]
67yo Male, current smoker, with h/o CKD3, CHF, CAD s/p CABG (2007), HTN, pre-DM, current smoker, recent COVID-19 (tested positive 9/8) c/o  a/w NSTEMI and acute on chronic likely HFrEF; Found to have likely ALTAGRACIA on CKD;      # NSTEMI (non-ST elevation myocardial infarction)  seen by cardio   conservative rx   f/u with his own cardio as out pt  c/w present meds   currently CP free     # Acute systolic congestive heart failure.   ·  Plan: c/w lasix   cardio consult   now lasix PO    # Acute kidney injury superimposed on CKD.   ·  Plan: Likely acute on chronic, nephrology consulted   monitor renal fx   urinary retention s/p vila placed : today TOV   seen by urology     # Urinary retention   -s/p vila   on flomax   add proscar   TOV today  off abx : as urine c/s neg       # Thrombocytopenia  -monitor     # 2019 novel coronavirus disease (COVID-19).   ·  Plan:   -pt tested positive 9/8 and received 5d remdesivir/dexamethasone  -pt is satting well without supplemental oxygen needs  -monitor off COVID treatment    # CAD (coronary artery disease).   -c/w home ASA  d/c brilinta on d/c and restart plavix   -c/w atorvastatin 80  -check a1c, lipids, tsh    DM-2     -hold home jardiance  -check A1c  FSBS with Marybeth     dispo: ok to be d/c home in am , d/c brilinta and restart home plavix . TOV today , if failed TOV then plan for d/c with vila and leg bag     
67yo Male with h/o CKD3, CHF, CAD s/p CABG (2007), HTN, pre-DM, current smoker, recent COVID-19 (tested positive 9/8) c/o severe SOB affecting ambulation and weight gain of 10lb in 3 weeks    #Acute/Chronic HFrEF  -in setting of a obstructive uropathy  -ECHO reveals moderate LV dysfunction and moderate AS  -pt appears euvolemic  -cont 40mg PO lasix  -elevated troponin on admission in setting of ALTAGRACIA, advanced CAD and decompensated CHF likely more representative of demand  -pt with no cp or change in exercise tolerance  -reports similar hospitalization last year for similar complaints s/p cath revealing no disease  -will defer cath for now given asymptomatic status, ckd, mild hematuria and conjuctival hemorrhage in left eye  -pt will followup with his cardio on DC    #CAD s/p CABG  -continue current mgmt  -defer cath for now    #CKD  -renal followup    #Obstructive Uropathy  -s/p vila  -urology followup    No objection to DC with outpt followup w cardio in 1-2 weeks     35 minutes spent on total encounter; more than 50% of the visit was spent counseling and/or coordinating care by the attending physician.  
67yo Male, current smoker, with h/o CKD3, CHF, CAD s/p CABG (2007), HTN, pre-DM, current smoker, recent COVID-19 (tested positive 9/8) c/o  a/w NSTEMI and acute on chronic likely HFrEF; Found to have likely ALTAGRACIA on CKD;      # NSTEMI (non-ST elevation myocardial infarction)  seen by cardio   conservative rx   f/u with his own cardio as out pt  c/w present meds   currently CP free     # Acute systolic congestive heart failure.   ·  Plan: c/w lasix   cardio consult   can change lasix to PO from am     # Acute kidney injury superimposed on CKD.   ·  Plan: Likely acute on chronic, nephrology consulted   monitor renal fx   urinary retention s/p vila placed     # Urinary retention   -s/p vila   on flomax   add proscar   TOV in am       # Thrombocytopenia  -monitor     # 2019 novel coronavirus disease (COVID-19).   ·  Plan:   -pt tested positive 9/8 and received 5d remdesivir/dexamethasone  -pt is satting well without supplemental oxygen needs  -monitor off COVID treatment    # CAD (coronary artery disease).   -c/w home ASA  -loaded with brillinta earlier so Plavix on hold  -c/w atorvastatin 80  -check a1c, lipids, tsh    DM-2     -hold home jardiance  -check A1c  FSBS with Marybeth     dispo: TOV in am , if pass TOV , then plan for d/c home on oral lasix , if fail TOV then will need to go home with vila and leg bag and follow with urology as out pt    
67yo Male, current smoker, with h/o CKD3, CHF, CAD s/p CABG (2007), HTN, pre-DM, current smoker, recent COVID-19 (tested positive 9/8) c/o  a/w NSTEMI and acute on chronic likely HFrEF; Renal consulted for ALTAGRACIA on CKD Mx.   	  ALTAGRACIA on CKD3  ALTAGRACIA improving  Creatinine Trend: 1.42 <-- 1.54 <-- 1.86 <--, 1.80 <--, 2.17 <--, 2.40 <--  CKD by history. unknown b/l Cr  clinically hypervolemic  K, bicarb ok  UOP much imrpoved S/P Harper  will need Urology follow up    -agree w/ lasix IV 40mg BID  -holding home entresto given ALTAGRACIA  -agree w/holding colchicine   -c/w flomax given history of BPH  -check bladder scan x1 to r/o urinary retention  low Na diet, fluid restriction 1L/day  monitor  BMP daily and u/o   dose all meds for eGFR  avoid ACEi/ARB for now/NSAIDs/Nephrotoxics.    h/o CAD and CABG w/NSTEMI (non-ST elevation myocardial infarction).   -s/p ASA, brillinta load  -started on hep gtt ACS  patient says he had a cardiac cath 1 year ago and no obstruction with same symptoms  if planned for cath, advised to premedicate with mucomyst 1200mg PO x 4 doses, 2 pre- 2 post-procedure 12hrs apart      For any question, call:  Cell # [phone]  Pager # [phone]  Callback # [phone]
65yo Male, current smoker, with h/o CKD3, CHF, CAD s/p CABG (2007), HTN, pre-DM, current smoker, recent COVID-19 (tested positive 9/8) c/o  a/w NSTEMI and acute on chronic likely HFrEF; Renal consulted for ALTAGRACIA on CKD Mx.   	  ALTAGRACIA on CKD3  ALTAGRACIA improving  Creatinine Trend: 1.29 <--, 1.42 <--, 1.54 <--, 1.86 <--, 1.80 <--, 2.17 <--, 2.40 <--  CKD by history. unknown b/l Cr  clinically hypervolemic  K, bicarb ok  UOP much improved S/P Harper-     -s/p lasix IV 40mg BID>now 40mg po qdaily  -holding home entresto given ALTAGRACIA- can resume tomorrow if ok w/cardiology  -agree w/holding colchicine. can use prn for acute gout  -c/w flomax given history of BPH  TOV per Urology  low Na diet, fluid restriction 1L/day  monitor BMP daily and u/o   dose all meds for eGFR  avoid NSAIDs/Nephrotoxics.    h/o CAD and CABG w/NSTEMI (non-ST elevation myocardial infarction).   -s/p ASA, brillinta load  off hep gtt now  cardio note reviewed. no plan for cardiac cath    will f/u closely  poc d/w pt  labs, chart reviewed  For any question, call:  Cell # [phone]  Pager # [phone]  office # [phone]  
67yo Male, current smoker, with h/o CKD3, CHF, CAD s/p CABG (2007), HTN, pre-DM, current smoker, recent COVID-19 (tested positive 9/8) c/o  a/w NSTEMI and acute on chronic likely HFrEF; Found to have likely ALTAGRACIA on CKD;      # NSTEMI (non-ST elevation myocardial infarction)  -as per ED cardio consulted , but unable to see any note   will consult Dr. Rao   pt. is CP free   -EKG with TWIs in lateral leads, no ST changes  -s/p ASA, brillinta load  d/c heparin GTT   started on lovenox     # Acute systolic congestive heart failure.   ·  Plan: c/w lasix   cardio consult     # Acute kidney injury superimposed on CKD.   ·  Plan: Likely acute on chronicnephrology consulted   monitor renal fx   urinary retention s/p vila placed     # Urinary retention   -s/p vila   on flomax   add proscar in am       # Thrombocytopenia  -monitor     # 2019 novel coronavirus disease (COVID-19).   ·  Plan:   -pt tested positive 9/8 and received 5d remdesivir/dexamethasone  -pt is satting well without supplemental oxygen needs  -monitor off COVID treatment    # CAD (coronary artery disease).   -c/w home ASA  -loaded with brillinta earlier so Plavix on hold  -c/w atorvastatin 80  -check a1c, lipids, tsh  -Plan as above for NSTEMI.    DM-2     -hold home jardiance  -check A1c  FSBS with Marybeth     dispo: cardio consulted Dr. Rao    
67yo Male, current smoker, with h/o CKD3, CHF, CAD s/p CABG (2007), HTN, pre-DM, current smoker, recent COVID-19 (tested positive 9/8) c/o  a/w NSTEMI and acute on chronic likely HFrEF; Found to have likely ALTAGRACIA on CKD;

## 2022-09-22 NOTE — PROGRESS NOTE ADULT - PROVIDER SPECIALTY LIST ADULT
Cardiology
Internal Medicine
Nephrology

## 2022-09-22 NOTE — DIETITIAN INITIAL EVALUATION ADULT - ORAL INTAKE PTA/DIET HISTORY
Pt lives at home with his wife. They cook together at home and order food out. No difficulty obtaining groceries. No specific diet followed PTA. No supplements/Vitamins taken PTA.

## 2022-09-22 NOTE — DIETITIAN INITIAL EVALUATION ADULT - NS FNS DIET ORDER
Diet, DASH/TLC:   Sodium & Cholesterol Restricted  No Concentrated Potassium  No Concentrated Phosphorus (09-18-22 @ 20:37) [Active]

## 2022-09-22 NOTE — GOALS OF CARE CONVERSATION - ADVANCED CARE PLANNING - CONVERSATION DETAILS
pt. is AAOx 3 and make his own decision , d/w him regarding advance directive. we discuss with him regarding Intubation / CPR if the need arise. he want to be full code for now and wants every possible treatment available

## 2022-09-22 NOTE — DIETITIAN INITIAL EVALUATION ADULT - ADD RECOMMEND
1) Recommend change diet to: Low sodium, no concentrated k, no concentrated phos.   2) Recommend Glucerna  1x daily (220kcals, 10g protein) to promote PO intake  3) Recommend multivitamin once daily for micronutrient provisions   4) Encourage PO intake and honor food preferences as able.   5) Continue to Monitor weights, labs, BM's, skin integrity, p.o. intake.  6) Reconsult RD as needed

## 2022-09-22 NOTE — DIETITIAN INITIAL EVALUATION ADULT - OTHER INFO
Per chart, 65yo Male, current smoker, with h/o CKD3, CHF, CAD s/p CABG (2007), HTN, pre-DM, current smoker, recent COVID-19 (tested positive 9/8) c/o  a/w NSTEMI and acute on chronic likely HFrEF; Found to have likely ALTAGRACIA on CKD;    Nutrition interview: No recent episodes of nausea, vomiting, diarrhea or constipation, BM noted on x3 days ago per pt, which he states is not normal for him, however admits he is not eating very much right now, does not c/o constipation. Denies any chewing/swallowing difficulties. No food allergies. Stated UBW: ~210-214#, Pt current wt on admission (9/19): 227.1#, Pt is unsure if he has gained weight. Food preferences explored and noted. Intake is 25-50% per pt. Pt states he has not had much of an appetite the last few days. Feeding skills: independent. Pt's stated height is 6'0".     Pt did not want verbal education today however was interested in receiving written education. RD provided pt with written education regarding CHF and DM nutrition therapy for pt to go over at his leisure.

## 2022-09-22 NOTE — PROGRESS NOTE ADULT - SUBJECTIVE AND OBJECTIVE BOX
CARDIOLOGY FOLLOW UP - Dr. Rao  Date of Service: 9/22/22  CC: no events    Review of Systems:  Constitutional: No fever, weight loss, or fatigue  Respiratory: No cough, wheezing, or hemoptysis, no shortness of breath  Cardiovascular: No chest pain, palpitations, passing out, dizziness, or leg swelling  Gastrointestinal: No abd or epigastric pain. No nausea, vomiting, or hematemesis; no diarrhea or consiptaiton, no melena or hematochezia  Vascular: No edema     TELEMETRY:    PHYSICAL EXAM:  T(C): 37 (09-22-22 @ 16:50), Max: 37.1 (09-21-22 @ 21:50)  HR: 85 (09-22-22 @ 16:50) (72 - 88)  BP: 138/85 (09-22-22 @ 16:50) (134/80 - 156/81)  RR: 19 (09-22-22 @ 16:50) (19 - 20)  SpO2: 98% (09-22-22 @ 16:50) (96% - 98%)  Wt(kg): --  I&O's Summary    21 Sep 2022 07:01  -  22 Sep 2022 07:00  --------------------------------------------------------  IN: 50 mL / OUT: 2550 mL / NET: -2500 mL    22 Sep 2022 07:01  -  22 Sep 2022 17:39  --------------------------------------------------------  IN: 0 mL / OUT: 1400 mL / NET: -1400 mL        Appearance: Normal	  Cardiovascular: Normal S1 S2,RRR, No JVD, No murmurs  Respiratory: Lungs clear to auscultation	  Gastrointestinal:  Soft, Non-tender, + BS	  Extremities: Normal range of motion, No clubbing, cyanosis or edema  Vascular: Peripheral pulses palpable 2+ bilaterally       Home Medications:  aspirin 81 mg oral tablet: 1 tab(s) orally once a day (18 Sep 2022 20:05)  atorvastatin 80 mg oral tablet: 1 tab(s) orally once a day (18 Sep 2022 20:05)  colchicine 0.6 mg oral tablet: 1 tab(s) orally once a day (18 Sep 2022 20:05)  Entresto 97 mg-103 mg oral tablet: 1 tab(s) orally 2 times a day (18 Sep 2022 20:05)  eplerenone 25 mg oral tablet: 1 tab(s) orally once a day (18 Sep 2022 20:05)  Flomax 0.4 mg oral capsule: 1 cap(s) orally once a day (18 Sep 2022 20:05)  hydrALAZINE 50 mg oral tablet: 1 tab(s) orally 3 times a day (18 Sep 2022 20:05)  isosorbide dinitrate 20 mg oral tablet: 1 tab(s) sublingual once a day (18 Sep 2022 20:05)  Jardiance 10 mg oral tablet: 1 tab(s) orally once a day (in the morning) (18 Sep 2022 20:05)  Metoprolol Tartrate 50 mg oral tablet: 1 tab(s) orally 2 times a day (18 Sep 2022 20:05)  Plavix 75 mg oral tablet: 1 tab(s) orally once a day (18 Sep 2022 20:05)  Vitamin D3 25 mcg (1000 intl units) oral tablet: 1 tab(s) orally once a day (18 Sep 2022 20:05)        MEDICATIONS  (STANDING):  aspirin  chewable 81 milliGRAM(s) Oral daily  atorvastatin 80 milliGRAM(s) Oral at bedtime  cefTRIAXone   IVPB      cefTRIAXone   IVPB 1000 milliGRAM(s) IV Intermittent every 24 hours  cholecalciferol 1000 Unit(s) Oral daily  dextrose 5%. 1000 milliLiter(s) (100 mL/Hr) IV Continuous <Continuous>  dextrose 5%. 1000 milliLiter(s) (50 mL/Hr) IV Continuous <Continuous>  dextrose 50% Injectable 25 Gram(s) IV Push once  dextrose 50% Injectable 12.5 Gram(s) IV Push once  dextrose 50% Injectable 25 Gram(s) IV Push once  enoxaparin Injectable 40 milliGRAM(s) SubCutaneous every 24 hours  finasteride 5 milliGRAM(s) Oral daily  furosemide    Tablet 40 milliGRAM(s) Oral daily  glucagon  Injectable 1 milliGRAM(s) IntraMuscular once  hydrALAZINE 50 milliGRAM(s) Oral three times a day  insulin lispro (ADMELOG) corrective regimen sliding scale   SubCutaneous three times a day before meals  insulin lispro (ADMELOG) corrective regimen sliding scale   SubCutaneous at bedtime  melatonin 6 milliGRAM(s) Oral at bedtime  pantoprazole    Tablet 40 milliGRAM(s) Oral before breakfast  tamsulosin 0.4 milliGRAM(s) Oral at bedtime  ticagrelor 90 milliGRAM(s) Oral every 12 hours        EKG:  RADIOLOGY:  DIAGNOSTIC TESTING:  [ ] Echocardiogram:  [ ] Catherterization:  [ ] Stress Test:  OTHER:     LABS:	 	                          13.0   8.57  )-----------( 66       ( 22 Sep 2022 06:20 )             38.9     09-22    139  |  99  |  37<H>  ----------------------------<  87  4.0   |  27  |  1.29    Ca    9.1      22 Sep 2022 06:20  Phos  3.3     09-22  Mg     1.70     09-22        PTT - ( 21 Sep 2022 06:40 )  PTT:30.4 sec    CARDIAC MARKERS:

## 2022-09-22 NOTE — PROGRESS NOTE ADULT - SUBJECTIVE AND OBJECTIVE BOX
Patient is a 66y old  Male who presents with a chief complaint of SOB (22 Sep 2022 17:38)      INTERVAL HPI/OVERNIGHT EVENTS: doing fair , denies any SOB or CP  T(C): 37 (22 @ 16:50), Max: 37.1 (22 @ 21:50)  HR: 85 (22 @ 16:50) (72 - 88)  BP: 138/85 (22 @ 16:50) (134/80 - 156/81)  RR: 19 (22 @ 16:50) (19 - 20)  SpO2: 98% (22 @ 16:50) (96% - 98%)  Wt(kg): --  I&O's Summary    21 Sep 2022 07:01  -  22 Sep 2022 07:00  --------------------------------------------------------  IN: 50 mL / OUT: 2550 mL / NET: -2500 mL    22 Sep 2022 07:01  -  22 Sep 2022 19:30  --------------------------------------------------------  IN: 0 mL / OUT: 1400 mL / NET: -1400 mL        PAST MEDICAL & SURGICAL HISTORY:  Heart failure      Stage 3 chronic kidney disease      HTN (hypertension)      Prediabetes      CAD (coronary artery disease)      S/P CABG x 1          SOCIAL HISTORY  Alcohol:  Tobacco:  Illicit substance use:    FAMILY HISTORY:    REVIEW OF SYSTEMS:  CONSTITUTIONAL: No fever, weight loss, or fatigue  EYES: No eye pain, visual disturbances, or discharge  ENMT:  No difficulty hearing, tinnitus, vertigo; No sinus or throat pain  NECK: No pain or stiffness  RESPIRATORY: No cough, wheezing, chills or hemoptysis; No shortness of breath  CARDIOVASCULAR: No chest pain, palpitations, dizziness, or leg swelling  GASTROINTESTINAL: No abdominal or epigastric pain. No nausea, vomiting, or hematemesis; No diarrhea or constipation. No melena or hematochezia.  GENITOURINARY: No dysuria, frequency, hematuria, or incontinence  NEUROLOGICAL: No headaches, memory loss, loss of strength, numbness, or tremors  SKIN: No itching, burning, rashes, or lesions   LYMPH NODES: No enlarged glands  ENDOCRINE: No heat or cold intolerance; No hair loss  MUSCULOSKELETAL: No joint pain or swelling; No muscle, back, or extremity pain  PSYCHIATRIC: No depression, anxiety, mood swings, or difficulty sleeping  HEME/LYMPH: No easy bruising, or bleeding gums  ALLERY AND IMMUNOLOGIC: No hives or eczema    RADIOLOGY & ADDITIONAL TESTS:    Imaging Personally Reviewed:  [ ] YES  [ ] NO    Consultant(s) Notes Reviewed:  [ ] YES  [ ] NO    PHYSICAL EXAM:  GENERAL: NAD, well-groomed, well-developed  HEAD:  Atraumatic, Normocephalic  EYES: EOMI, PERRLA, conjunctiva and sclera clear  ENMT: No tonsillar erythema, exudates, or enlargement; Moist mucous membranes, Good dentition, No lesions  NECK: Supple, No JVD, Normal thyroid  NERVOUS SYSTEM:  Alert & Oriented X3, Good concentration; Motor Strength 5/5 B/L upper and lower extremities; DTRs 2+ intact and symmetric  CHEST/LUNG: Clear to percussion bilaterally; No rales, rhonchi, wheezing, or rubs  HEART: Regular rate and rhythm; No murmurs, rubs, or gallops  ABDOMEN: Soft, Nontender, Nondistended; Bowel sounds present  EXTREMITIES:  2+ Peripheral Pulses, No clubbing, cyanosis, or edema  LYMPH: No lymphadenopathy noted  SKIN: No rashes or lesions    LABS:                        13.0   8.57  )-----------( 65       ( 22 Sep 2022 06:20 )             38.9     -    139  |  99  |  37<H>  ----------------------------<  87  4.0   |  27  |  1.29    Ca    9.1      22 Sep 2022 06:20  Phos  3.3       Mg     1.70     -      PTT - ( 21 Sep 2022 06:40 )  PTT:30.4 sec  Urinalysis Basic - ( 21 Sep 2022 06:40 )    Color: Light Brown / Appearance: Slightly Turbid / S.009 / pH: x  Gluc: x / Ketone: Negative  / Bili: Negative / Urobili: <2 mg/dL   Blood: x / Protein: Trace / Nitrite: Negative   Leuk Esterase: Large / RBC: 467 /HPF / WBC 37 /HPF   Sq Epi: x / Non Sq Epi: 2 /HPF / Bacteria: Negative      CAPILLARY BLOOD GLUCOSE      POCT Blood Glucose.: 127 mg/dL (22 Sep 2022 17:52)  POCT Blood Glucose.: 144 mg/dL (22 Sep 2022 13:34)  POCT Blood Glucose.: 106 mg/dL (22 Sep 2022 08:58)        Urinalysis Basic - ( 21 Sep 2022 06:40 )    Color: Light Brown / Appearance: Slightly Turbid / S.009 / pH: x  Gluc: x / Ketone: Negative  / Bili: Negative / Urobili: <2 mg/dL   Blood: x / Protein: Trace / Nitrite: Negative   Leuk Esterase: Large / RBC: 467 /HPF / WBC 37 /HPF   Sq Epi: x / Non Sq Epi: 2 /HPF / Bacteria: Negative        MEDICATIONS  (STANDING):  aspirin  chewable 81 milliGRAM(s) Oral daily  atorvastatin 80 milliGRAM(s) Oral at bedtime  cholecalciferol 1000 Unit(s) Oral daily  dextrose 5%. 1000 milliLiter(s) (100 mL/Hr) IV Continuous <Continuous>  dextrose 5%. 1000 milliLiter(s) (50 mL/Hr) IV Continuous <Continuous>  dextrose 50% Injectable 25 Gram(s) IV Push once  dextrose 50% Injectable 12.5 Gram(s) IV Push once  dextrose 50% Injectable 25 Gram(s) IV Push once  enoxaparin Injectable 40 milliGRAM(s) SubCutaneous every 24 hours  finasteride 5 milliGRAM(s) Oral daily  furosemide    Tablet 40 milliGRAM(s) Oral daily  glucagon  Injectable 1 milliGRAM(s) IntraMuscular once  hydrALAZINE 50 milliGRAM(s) Oral three times a day  insulin lispro (ADMELOG) corrective regimen sliding scale   SubCutaneous three times a day before meals  insulin lispro (ADMELOG) corrective regimen sliding scale   SubCutaneous at bedtime  melatonin 6 milliGRAM(s) Oral at bedtime  pantoprazole    Tablet 40 milliGRAM(s) Oral before breakfast  tamsulosin 0.4 milliGRAM(s) Oral at bedtime  ticagrelor 90 milliGRAM(s) Oral every 12 hours    MEDICATIONS  (PRN):  dextrose Oral Gel 15 Gram(s) Oral once PRN Blood Glucose LESS THAN 70 milliGRAM(s)/deciliter  lidocaine 2% Gel 1 Application(s) Topical two times a day PRN pain at vila insertion site  nicotine  Polacrilex Lozenge 4 milliGRAM(s) Oral every 4 hours PRN smoking cessation      Care Discussed with Consultants/Other Providers [ ] YES  [ ] NO

## 2022-09-22 NOTE — PROGRESS NOTE ADULT - SUBJECTIVE AND OBJECTIVE BOX
[facility] - S Jeff / Cassandra S /D Ramsey/ S Fidel/ CARLOS Collins/ Mohan Allison / [facility]/ O Robby  service -[phone], office [phone]  ---------------------------------------------------------------------------------------------------------------    Patient seen and examined bedside    Subjective and Objective: No overnight events, sob resolved. No complaints today. feeling better    Allergies: No Known Allergies      Hospital Medications:   MEDICATIONS  (STANDING):  aspirin  chewable 81 milliGRAM(s) Oral daily  atorvastatin 80 milliGRAM(s) Oral at bedtime  cefTRIAXone   IVPB      cefTRIAXone   IVPB 1000 milliGRAM(s) IV Intermittent every 24 hours  cholecalciferol 1000 Unit(s) Oral daily  dextrose 5%. 1000 milliLiter(s) (100 mL/Hr) IV Continuous <Continuous>  dextrose 5%. 1000 milliLiter(s) (50 mL/Hr) IV Continuous <Continuous>  dextrose 50% Injectable 25 Gram(s) IV Push once  dextrose 50% Injectable 12.5 Gram(s) IV Push once  dextrose 50% Injectable 25 Gram(s) IV Push once  enoxaparin Injectable 40 milliGRAM(s) SubCutaneous every 24 hours  finasteride 5 milliGRAM(s) Oral daily  furosemide    Tablet 40 milliGRAM(s) Oral daily  glucagon  Injectable 1 milliGRAM(s) IntraMuscular once  hydrALAZINE 50 milliGRAM(s) Oral three times a day  insulin lispro (ADMELOG) corrective regimen sliding scale   SubCutaneous three times a day before meals  insulin lispro (ADMELOG) corrective regimen sliding scale   SubCutaneous at bedtime  melatonin 6 milliGRAM(s) Oral at bedtime  pantoprazole    Tablet 40 milliGRAM(s) Oral before breakfast  tamsulosin 0.4 milliGRAM(s) Oral at bedtime  ticagrelor 90 milliGRAM(s) Oral every 12 hours      REVIEW OF SYSTEMS:  CONSTITUTIONAL: No weakness, fevers or chills  EYES/ENT: No visual changes;  No vertigo or throat pain   NECK: No pain or stiffness  RESPIRATORY: No cough, wheezing, hemoptysis; No shortness of breath  CARDIOVASCULAR: No chest pain or palpitations.  GASTROINTESTINAL: No abdominal or epigastric pain. No nausea, vomiting, or hematemesis; No diarrhea or constipation. No melena or hematochezia.  GENITOURINARY: No dysuria, frequency, foamy urine, urinary urgency, incontinence or hematuria  NEUROLOGICAL: No numbness or weakness  SKIN: No itching, burning, rashes, or lesions   VASCULAR: No bilateral lower extremity edema.   All other review of systems is negative unless indicated above.    VITALS:  T(F): 98.4 (22 @ 12:55), Max: 98.8 (22 @ 06:40)  HR: 83 (22 @ 12:55)  BP: 156/81 (22 @ 12:55)  RR: 19 (22 @ 12:55)  SpO2: 97% (22 @ 12:55)  Wt(kg): --     @ 07:01  -   @ 07:00  --------------------------------------------------------  IN: 50 mL / OUT: 2550 mL / NET: -2500 mL     @ 07:01  -   @ 15:40  --------------------------------------------------------  IN: 0 mL / OUT: 1400 mL / NET: -1400 mL          PHYSICAL EXAM:  Constitutional: NAD  HEENT: anicteric sclera, oropharynx clear  Neck: No JVD  Respiratory: CTAB, no wheezes, rales or rhonchi  Cardiovascular: S1, S2, RRR  Gastrointestinal: BS+, soft, NT/ND  Extremities: No cyanosis or clubbing. No peripheral edema  Neurological: A/O x 3, no focal deficits  Psychiatric: Normal mood, normal affect  : No CVA tenderness. No vila.   Skin: No rashes  Vascular Access:    LABS:      139  |  99  |  37<H>  ----------------------------<  87  4.0   |  27  |  1.29    Ca    9.1      22 Sep 2022 06:20  Phos  3.3       Mg     1.70           Creatinine Trend: 1.29 <--, 1.42 <--, 1.54 <--, 1.86 <--, 1.80 <--, 2.17 <--, 2.40 <--                        13.0   8.57  )-----------( 66       ( 22 Sep 2022 06:20 )             38.9     Urine Studies:  Urinalysis Basic - ( 21 Sep 2022 06:40 )    Color: Light Brown / Appearance: Slightly Turbid / S.009 / pH:   Gluc:  / Ketone: Negative  / Bili: Negative / Urobili: <2 mg/dL   Blood:  / Protein: Trace / Nitrite: Negative   Leuk Esterase: Large / RBC: 467 /HPF / WBC 37 /HPF   Sq Epi:  / Non Sq Epi: 2 /HPF / Bacteria: Negative      Sodium, Random Urine: 121 mmol/L ( @ 09:20)  Creatinine, Random Urine: 13 mg/dL ( @ 09:20)      RADIOLOGY & ADDITIONAL STUDIES:   [facility] - S Jeff / Cassandra S /D Ramsey/ S Fidel/ CARLOS Collins/ Mohan Allison / [facility]/ O Robby  service -[phone], office [phone]  ---------------------------------------------------------------------------------------------------------------    Patient seen and examined bedside    Subjective and Objective: No overnight events, sob better. No complaints today.     Allergies: No Known Allergies      Hospital Medications:   MEDICATIONS  (STANDING):  aspirin  chewable 81 milliGRAM(s) Oral daily  atorvastatin 80 milliGRAM(s) Oral at bedtime  cefTRIAXone   IVPB      cefTRIAXone   IVPB 1000 milliGRAM(s) IV Intermittent every 24 hours  cholecalciferol 1000 Unit(s) Oral daily  dextrose 5%. 1000 milliLiter(s) (100 mL/Hr) IV Continuous <Continuous>  dextrose 5%. 1000 milliLiter(s) (50 mL/Hr) IV Continuous <Continuous>  dextrose 50% Injectable 25 Gram(s) IV Push once  dextrose 50% Injectable 12.5 Gram(s) IV Push once  dextrose 50% Injectable 25 Gram(s) IV Push once  enoxaparin Injectable 40 milliGRAM(s) SubCutaneous every 24 hours  finasteride 5 milliGRAM(s) Oral daily  furosemide    Tablet 40 milliGRAM(s) Oral daily  glucagon  Injectable 1 milliGRAM(s) IntraMuscular once  hydrALAZINE 50 milliGRAM(s) Oral three times a day  insulin lispro (ADMELOG) corrective regimen sliding scale   SubCutaneous three times a day before meals  insulin lispro (ADMELOG) corrective regimen sliding scale   SubCutaneous at bedtime  melatonin 6 milliGRAM(s) Oral at bedtime  pantoprazole    Tablet 40 milliGRAM(s) Oral before breakfast  tamsulosin 0.4 milliGRAM(s) Oral at bedtime  ticagrelor 90 milliGRAM(s) Oral every 12 hours    VITALS:  T(F): 98.4 (22 @ 12:55), Max: 98.8 (22 @ 06:40)  HR: 83 (22 @ 12:55)  BP: 156/81 (22 @ 12:55)  RR: 19 (22 @ 12:55)  SpO2: 97% (22 @ 12:55)  Wt(kg): --     @ 07:01  -   @ 07:00  --------------------------------------------------------  IN: 50 mL / OUT: 2550 mL / NET: -2500 mL     @ 07:01  -   @ 15:40  --------------------------------------------------------  IN: 0 mL / OUT: 1400 mL / NET: -1400 mL      PHYSICAL EXAM:  Constitutional: NAD  HEENT: anicteric sclera  Neck: No JVD  Respiratory: CTAB, no wheezes  Cardiovascular: S1, S2, RRR  Gastrointestinal: BS+, soft, NT/ND  Extremities: no peripheral edema b/l   Neurological: A/O x 3  Psychiatric: Normal mood, normal affect  : + julito.     LABS:      139  |  99  |  37<H>  ----------------------------<  87  4.0   |  27  |  1.29    Ca    9.1      22 Sep 2022 06:20  Phos  3.3       Mg     1.70           Creatinine Trend: 1.29 <--, 1.42 <--, 1.54 <--, 1.86 <--, 1.80 <--, 2.17 <--, 2.40 <--                        13.0   8.57  )-----------( 66       ( 22 Sep 2022 06:20 )             38.9     Urine Studies:  Urinalysis Basic - ( 21 Sep 2022 06:40 )    Color: Light Brown / Appearance: Slightly Turbid / S.009 / pH:   Gluc:  / Ketone: Negative  / Bili: Negative / Urobili: <2 mg/dL   Blood:  / Protein: Trace / Nitrite: Negative   Leuk Esterase: Large / RBC: 467 /HPF / WBC 37 /HPF   Sq Epi:  / Non Sq Epi: 2 /HPF / Bacteria: Negative      Sodium, Random Urine: 121 mmol/L ( @ 09:20)  Creatinine, Random Urine: 13 mg/dL ( @ 09:20)      RADIOLOGY & ADDITIONAL STUDIES:

## 2022-09-22 NOTE — DIETITIAN INITIAL EVALUATION ADULT - PERTINENT LABORATORY DATA
09-22 Na 139 mmol/L Glu 87 mg/dL K+ 4.0 mmol/L Cr 1.29 mg/dL BUN 37 mg/dL<H> Phos 3.3 mg/dL  09-22 @ 13:34 POCT 144 mg/dL  09-22 @ 08:58 POCT 106 mg/dL  09-21 @ 18:06 POCT 102 mg/dL      POCT Blood Glucose.: 144 mg/dL (09-22-22 @ 13:34)  A1C with Estimated Average Glucose Result: 6.4 % (09-19-22 @ 07:20)

## 2022-09-23 ENCOUNTER — TRANSCRIPTION ENCOUNTER (OUTPATIENT)
Age: 67
End: 2022-09-23

## 2022-09-23 VITALS
SYSTOLIC BLOOD PRESSURE: 145 MMHG | TEMPERATURE: 98 F | DIASTOLIC BLOOD PRESSURE: 75 MMHG | OXYGEN SATURATION: 97 % | RESPIRATION RATE: 18 BRPM | HEART RATE: 61 BPM

## 2022-09-23 PROBLEM — I10 ESSENTIAL (PRIMARY) HYPERTENSION: Chronic | Status: ACTIVE | Noted: 2022-09-17

## 2022-09-23 PROBLEM — I50.9 HEART FAILURE, UNSPECIFIED: Chronic | Status: ACTIVE | Noted: 2022-09-17

## 2022-09-23 PROBLEM — N18.30 CHRONIC KIDNEY DISEASE, STAGE 3 UNSPECIFIED: Chronic | Status: ACTIVE | Noted: 2022-09-17

## 2022-09-23 PROBLEM — I25.10 ATHEROSCLEROTIC HEART DISEASE OF NATIVE CORONARY ARTERY WITHOUT ANGINA PECTORIS: Chronic | Status: ACTIVE | Noted: 2022-09-18

## 2022-09-23 PROBLEM — R73.03 PREDIABETES: Chronic | Status: ACTIVE | Noted: 2022-09-18

## 2022-09-23 LAB
ANION GAP SERPL CALC-SCNC: 15 MMOL/L — HIGH (ref 7–14)
BUN SERPL-MCNC: 36 MG/DL — HIGH (ref 7–23)
CALCIUM SERPL-MCNC: 9.1 MG/DL — SIGNIFICANT CHANGE UP (ref 8.4–10.5)
CHLORIDE SERPL-SCNC: 95 MMOL/L — LOW (ref 98–107)
CO2 SERPL-SCNC: 19 MMOL/L — LOW (ref 22–31)
CREAT SERPL-MCNC: 1.24 MG/DL — SIGNIFICANT CHANGE UP (ref 0.5–1.3)
EGFR: 64 ML/MIN/1.73M2 — SIGNIFICANT CHANGE UP
GLUCOSE BLDC GLUCOMTR-MCNC: 119 MG/DL — HIGH (ref 70–99)
GLUCOSE SERPL-MCNC: 105 MG/DL — HIGH (ref 70–99)
HCT VFR BLD CALC: 41.8 % — SIGNIFICANT CHANGE UP (ref 39–50)
HGB BLD-MCNC: 14.2 G/DL — SIGNIFICANT CHANGE UP (ref 13–17)
MAGNESIUM SERPL-MCNC: 2 MG/DL — SIGNIFICANT CHANGE UP (ref 1.6–2.6)
MCHC RBC-ENTMCNC: 31.1 PG — SIGNIFICANT CHANGE UP (ref 27–34)
MCHC RBC-ENTMCNC: 34 GM/DL — SIGNIFICANT CHANGE UP (ref 32–36)
MCV RBC AUTO: 91.5 FL — SIGNIFICANT CHANGE UP (ref 80–100)
NRBC # BLD: 0 /100 WBCS — SIGNIFICANT CHANGE UP (ref 0–0)
NRBC # FLD: 0 K/UL — SIGNIFICANT CHANGE UP (ref 0–0)
PHOSPHATE SERPL-MCNC: 4 MG/DL — SIGNIFICANT CHANGE UP (ref 2.5–4.5)
PLATELET # BLD AUTO: 66 K/UL — LOW (ref 150–400)
POTASSIUM SERPL-MCNC: 5.1 MMOL/L — SIGNIFICANT CHANGE UP (ref 3.5–5.3)
POTASSIUM SERPL-SCNC: 5.1 MMOL/L — SIGNIFICANT CHANGE UP (ref 3.5–5.3)
RBC # BLD: 4.57 M/UL — SIGNIFICANT CHANGE UP (ref 4.2–5.8)
RBC # FLD: 13.1 % — SIGNIFICANT CHANGE UP (ref 10.3–14.5)
SODIUM SERPL-SCNC: 129 MMOL/L — LOW (ref 135–145)
WBC # BLD: 8.65 K/UL — SIGNIFICANT CHANGE UP (ref 3.8–10.5)
WBC # FLD AUTO: 8.65 K/UL — SIGNIFICANT CHANGE UP (ref 3.8–10.5)

## 2022-09-23 RX ORDER — FINASTERIDE 5 MG/1
1 TABLET, FILM COATED ORAL
Qty: 30 | Refills: 0
Start: 2022-09-23 | End: 2022-10-22

## 2022-09-23 RX ORDER — TICAGRELOR 90 MG/1
1 TABLET ORAL
Qty: 60 | Refills: 0
Start: 2022-09-23 | End: 2022-10-22

## 2022-09-23 RX ORDER — FUROSEMIDE 40 MG
1 TABLET ORAL
Qty: 30 | Refills: 0
Start: 2022-09-23 | End: 2022-10-22

## 2022-09-23 RX ORDER — SACUBITRIL AND VALSARTAN 24; 26 MG/1; MG/1
1 TABLET, FILM COATED ORAL
Qty: 0 | Refills: 0 | DISCHARGE

## 2022-09-23 RX ORDER — ISOSORBIDE DINITRATE 5 MG/1
1 TABLET ORAL
Qty: 0 | Refills: 0 | DISCHARGE

## 2022-09-23 RX ORDER — EPLERENONE 50 MG/1
1 TABLET, FILM COATED ORAL
Qty: 0 | Refills: 0 | DISCHARGE

## 2022-09-23 RX ORDER — METOPROLOL TARTRATE 50 MG
1 TABLET ORAL
Qty: 60 | Refills: 0
Start: 2022-09-23 | End: 2022-10-22

## 2022-09-23 RX ORDER — CLOPIDOGREL BISULFATE 75 MG/1
1 TABLET, FILM COATED ORAL
Qty: 0 | Refills: 0 | DISCHARGE

## 2022-09-23 RX ORDER — COLCHICINE 0.6 MG
1 TABLET ORAL
Qty: 0 | Refills: 0 | DISCHARGE

## 2022-09-23 RX ORDER — METOPROLOL TARTRATE 50 MG
1 TABLET ORAL
Qty: 0 | Refills: 0 | DISCHARGE

## 2022-09-23 RX ADMIN — TICAGRELOR 90 MILLIGRAM(S): 90 TABLET ORAL at 05:39

## 2022-09-23 RX ADMIN — PANTOPRAZOLE SODIUM 40 MILLIGRAM(S): 20 TABLET, DELAYED RELEASE ORAL at 05:38

## 2022-09-23 RX ADMIN — Medication 50 MILLIGRAM(S): at 05:39

## 2022-09-23 RX ADMIN — Medication 40 MILLIGRAM(S): at 05:39

## 2022-09-23 NOTE — PROVIDER CONTACT NOTE (OTHER) - DATE AND TIME:
19-Sep-2022 21:55
20-Sep-2022 04:00
21-Sep-2022 06:13
23-Sep-2022 01:14
20-Sep-2022 12:00
23-Sep-2022 08:44

## 2022-09-23 NOTE — PROVIDER CONTACT NOTE (OTHER) - NAME OF MD/NP/PA/DO NOTIFIED:
ANIA Cabrales NP
Macho Montana,
ARMAND Hernandez #[id number]
Vickie Brooks
ARMAND Catalan #[id number]
Macho Montana,

## 2022-09-23 NOTE — DISCHARGE NOTE NURSING/CASE MANAGEMENT/SOCIAL WORK - NSDCPEEMAIL_GEN_ALL_CORE
[facility] for Tobacco Control email [email or web address]

## 2022-09-23 NOTE — DISCHARGE NOTE NURSING/CASE MANAGEMENT/SOCIAL WORK - NSDCPEFALRISK_GEN_ALL_CORE
For information on Fall & Injury Prevention, visit: [email or web address] OR  [email or web address] OR  https://www.cdc.gov/steadi/patient.html

## 2022-09-23 NOTE — PROVIDER CONTACT NOTE (OTHER) - REASON
Patient Refusing Tele
High Blood Pressure 165/73
patient refusing telemetry
4 beats Vtach
Bladder Scan showing > 500 ml post void
patient Harper output urine noted with blood-tinged

## 2022-09-23 NOTE — DISCHARGE NOTE NURSING/CASE MANAGEMENT/SOCIAL WORK - NSDCFUADDAPPT_GEN_ALL_CORE_FT
APPTS ARE READY TO BE MADE: [ x ] YES    Best Family or Patient Contact (if needed): Patient has cell phone on him so he can be reached at [phone]. Other contact is Eri Naranjo ( Daughter ) ([phone])      Additional Information about above appointments (if needed):    1: Follow up with Cardiologist Dr. Wilbur Austin in 1-2 weeks.  2: Follow up with PCP (Medicine clinic was emailed for assistance since patient does not have PCP).  3: Follow up with Urologist (patient has appointment already for 9/27/22 at 10:20AM)    Other comments or requests:

## 2022-09-23 NOTE — CHART NOTE - NSCHARTNOTEFT_GEN_A_CORE
Pt noted with ALTAGRACIA/bradycardia/soft BP during this admission. Entresto/ Eplerenone/ Isordil/ Metoprolol held during this admission. SBP now in 140s, HR 60-80s. Spoke with cardiologist, Dr. Rao, pt is stable for discharge from Cardio perspective. May c/w Lasix on DC with resumption of Metoprolol at lower dose (25 mg BID). Will continue to hold Entresto/ Eplerenone/ Isordil upon discharge.     ACP   [id number] Pt noted with ALTAGRAICA/bradycardia/soft BP during this admission. Entresto/ Eplerenone/ Isordil/ Metoprolol held during this admission. SBP now in 140s, HR 60-80s. Spoke with cardiologist, Dr. Rao, pt is stable for discharge from Cardio perspective. May c/w Lasix on DC with resumption of Metoprolol at lower dose (25 mg BID). Will continue to hold Entresto/ Eplerenone/ Isordil upon discharge.     Labs reviewed this AM. Na 129 (specimen hemolyzed). Pt seen at bedside, feeling well w/o complaints. Spoke with attending, Dr. lCemens, pt is medically stable for discharge to home.     ACP   [id number]

## 2022-09-23 NOTE — PROVIDER CONTACT NOTE (OTHER) - ASSESSMENT
patient Harper output urine noted with blood-tinged. not in any acute distress
patient agitated and wanting to go home refusing telemetry monitor at this time despite RN education
Pt resting in stretcher. Abdomen distended and tender upon palpation to pelvis. Attempted to cath patient x3 and unable.
High Blood Pressure 165/73. Patient denies headache, SOB, chest pain or weakness.
pt had episode of 4 beats vtach on tele. pt asymptomatic and asleep. VSS per flowsheet
Patient Refusing Tele. Patient is AOx4.

## 2022-09-23 NOTE — DISCHARGE NOTE NURSING/CASE MANAGEMENT/SOCIAL WORK - NSDCPEWEB_GEN_ALL_CORE
[facility] for Tobacco Control website --- [email or web address] website --- [email or web address]

## 2022-09-23 NOTE — PROVIDER CONTACT NOTE (OTHER) - RECOMMENDATIONS
Provider made aware
Recheck bp in one hour
Notify provider
Patient made aware of risk and educated on tele.
notify provider

## 2022-09-23 NOTE — DISCHARGE NOTE NURSING/CASE MANAGEMENT/SOCIAL WORK - PATIENT PORTAL LINK FT
You can access the FollowMyHealth Patient Portal offered by [facility] by registering at the following website: [email or web address]. By joining [employer]’s FollowMyHealth portal, you will also be able to view your health information using other applications (apps) compatible with our system.

## 2022-09-23 NOTE — PROVIDER CONTACT NOTE (OTHER) - BACKGROUND
Admitted for NSTEMI
Admitted for NSTEMI.
patient a&ox4 admitted for SOB and HF exacerbation
Patient admitted for NSTEMI.
Admitted for NSTEMI
Admitted for NSTEMI

## 2022-09-27 ENCOUNTER — APPOINTMENT (OUTPATIENT)
Dept: UROLOGY | Facility: CLINIC | Age: 67
End: 2022-09-27

## 2022-10-07 ENCOUNTER — APPOINTMENT (OUTPATIENT)
Dept: UROLOGY | Facility: CLINIC | Age: 67
End: 2022-10-07

## 2022-10-07 DIAGNOSIS — N40.1 BENIGN PROSTATIC HYPERPLASIA WITH LOWER URINARY TRACT SYMPMS: ICD-10-CM

## 2022-10-07 DIAGNOSIS — N13.8 BENIGN PROSTATIC HYPERPLASIA WITH LOWER URINARY TRACT SYMPMS: ICD-10-CM

## 2022-10-07 PROCEDURE — 99213 OFFICE O/P EST LOW 20 MIN: CPT

## 2022-10-08 PROBLEM — N40.1 BPH WITH OBSTRUCTION/LOWER URINARY TRACT SYMPTOMS: Status: ACTIVE | Noted: 2022-01-20

## 2022-10-08 NOTE — ASSESSMENT
[FreeTextEntry1] : We discussed how diuretics can sometimes provoke urinary retention with bladder over distention leading to the retention.  He is now voiding comfortably.  Its not clear that he does need to remain on finasteride and I think he can discontinue it given its negative impact on his libido.  We will monitor his urinary symptoms off of the medication but I would suggest he continue on with tamsulosin.  He understands and will do so.\par \par With regard to the erectile dysfunction follow-up he will see Dr. George again if he wishes to move forward with either the penile injection therapy approach or penile prosthesis.

## 2022-10-08 NOTE — HISTORY OF PRESENT ILLNESS
[FreeTextEntry1] : Kailash Naranjo returns to the office today.  He is a 67-year-old man who I first met in January of this year.  He had come in regarding erectile dysfunction.  He had also had urinary symptoms and history of BPH.  He had undergone a greenlight laser photo vaporization of the prostate with Dr. Keshav Molina around March 2020.  He notes that he does have a good urinary flow but apparently did have a recent episode of urinary retention that occurred earlier this year.  He had been hospitalized with a COVID infection about 4 to 5 weeks ago.  He had also been noted to go into heart failure at that hospitalization and diuresis ultimately provoked urinary retention and he was catheterized briefly for a few days.  He is now voiding again on his own and feels as though he is back to his baseline.  He is now on tamsulosin and finasteride both but is interested in stopping finasteride because it is negatively affecting his libido.  He had failed oral PDE 5 inhibitors previously for managing erectile dysfunction and has been considering either a penile prosthesis or penile injection therapy.  He has not pursued this recently because of the COVID infection.  He had seen Dr. George to discuss a possible prosthesis.

## 2022-10-11 NOTE — PROVIDER CONTACT NOTE (OTHER) - ACTION/TREATMENT ORDERED:
Provider made aware. Will follow the orders as it get prescribed. Safety maintained.
NP  to come to bedside and try .
Provider Aware
provider made aware
Provider Aware; will recheck bp in one hour.
Provider made aware. Safety maintained.
Statement Selected

## 2023-04-24 ENCOUNTER — APPOINTMENT (OUTPATIENT)
Dept: PULMONOLOGY | Facility: CLINIC | Age: 68
End: 2023-04-24
Payer: MEDICARE

## 2023-04-24 VITALS
SYSTOLIC BLOOD PRESSURE: 130 MMHG | HEART RATE: 62 BPM | BODY MASS INDEX: 26.92 KG/M2 | OXYGEN SATURATION: 92 % | DIASTOLIC BLOOD PRESSURE: 66 MMHG | TEMPERATURE: 97.3 F | WEIGHT: 193 LBS

## 2023-04-24 DIAGNOSIS — R91.1 SOLITARY PULMONARY NODULE: ICD-10-CM

## 2023-04-24 DIAGNOSIS — R93.89 ABNORMAL FINDINGS ON DIAGNOSTIC IMAGING OF OTHER SPECIFIED BODY STRUCTURES: ICD-10-CM

## 2023-04-24 PROCEDURE — 99214 OFFICE O/P EST MOD 30 MIN: CPT

## 2023-04-25 PROBLEM — R91.1 PULMONARY NODULE, LEFT: Status: ACTIVE | Noted: 2020-03-02

## 2023-04-25 PROBLEM — R93.89 ABNORMAL CHEST CT: Status: ACTIVE | Noted: 2020-03-02

## 2023-04-25 NOTE — HISTORY OF PRESENT ILLNESS
[Former] : former [TextBox_4] : He has been hospitalized several times in the past few months for congestive heart failure and cardiac arrest.  He has a defibrillator now.  He is being followed by the heart failure service at [facility].\par \par He came for follow-up today.  He has an occasional cough.  No chest pain.  No shortness of breath at rest. He does not smoke. [YearQuit] : 2022 [Awakes Unrefreshed] : does not awaken unrefreshed

## 2023-04-25 NOTE — REVIEW OF SYSTEMS
[Cough] : cough [Fever] : no fever [Chills] : no chills [Hemoptysis] : no hemoptysis [Chest Tightness] : no chest tightness [Sputum] : no sputum [Wheezing] : no wheezing [Chest Discomfort] : no chest discomfort [Edema] : no edema [Palpitations] : no palpitations [Nasal Discharge] : no nasal discharge [Abdominal Pain] : no abdominal pain [Nocturia] : no nocturia [Myalgias] : no myalgias [Rash] : no rash [Anemia] : no anemia [Headache] : no headache [Depression] : no depression [Diabetes] : no diabetes [Thyroid Problem] : no thyroid problem

## 2023-04-25 NOTE — DISCUSSION/SUMMARY
[FreeTextEntry1] : He is a 67 year old former smoker with a history of hypertension, hyperlipidemia, coronary artery disease, S/P CABG who has been followed for two pulmonary nodular opacities at the left leg lung base noted on Chest CT. The largest opacity was 12 x 6 mm on the Chest CT of 1/8/20. PET/CT of 3/8/20 without evidence of malignancy. CT 5/13//21: Scarring unchanged. No suspicious nodules. CT Chest 4/7/22: Stable 3 mm RUL nodule. Stable scarring\par \par He has been hospitalized several times in the past few months for congestive heart failure and cardiac arrest.  He has a defibrillator now.  He is being followed by the heart failure service at [facility].\par \par He came for follow-up today.  He has an occasional cough.  No chest pain.  No shortness of breath at rest. He does not smoke.\par \par His pulmonary status is stable at present.  We will obtain records from [facility].  Apparently a CT of the chest was done there.  Further recommendations to follow.

## 2023-04-25 NOTE — DISCUSSION/SUMMARY
[FreeTextEntry1] : He is a 67 year old former smoker with a history of hypertension, hyperlipidemia, coronary artery disease, S/P CABG who has been followed for two pulmonary nodular opacities at the left leg lung base noted on Chest CT. The largest opacity was 12 x 6 mm on the Chest CT of 1/8/20. PET/CT of 3/8/20 without evidence of malignancy. CT 5/13//21: Scarring unchanged. No suspicious nodules. CT Chest 4/7/22: Stable 3 mm RUL nodule. Stable scarring\par \par He has been hospitalized several times in the past few months for congestive heart failure and cardiac arrest.  He has a defibrillator now.  He is being followed by the heart failure service at [facility].\par \par He came for follow-up today.  He has an occasional cough.  No chest pain.  No shortness of breath at rest. He does not smoke.\par \par His pulmonary status is stable at present.  We will obtain records from .  Apparently a CT of the chest was done there.  Further recommendations to follow.

## 2023-04-25 NOTE — HISTORY OF PRESENT ILLNESS
[Former] : former [TextBox_4] : He has been hospitalized several times in the past few months for congestive heart failure and cardiac arrest.  He has a defibrillator now.  He is being followed by the heart failure service at [facility].\par \par He came for follow-up today.  He has an occasional cough.  No chest pain.  No shortness of breath at rest. He does not smoke. [YearQuit] : 2022 [Awakes Unrefreshed] : does not awaken unrefreshed

## 2023-04-25 NOTE — PROCEDURE
Detail Level: Detailed Detail Level: Generalized Detail Level: Zone Detail Level: Simple [FreeTextEntry1] : PFT 3/2/20: No obstruction. Moderate restriction. Mildly reduced diffusion. No change after bronchodilator.\par \par PFT 4/24/23: Moderate obstruction.  \par \par CT Chest ([facility]) 2/23/15: Scarring at the left lung base. No change from 8/2/12. \par \par CT Chest ([facility]) 12/16/17: Two subpleural nodules at the left base (specific size not reported). No change from prior CT of 2/15. \par \par CT Chest 1/8/20 ([facility]): Emphysematous changes. Two subpleural opacities in the left lower lobe measuring 12 x 6 mm and 9 x 7 mm. \par \par PET/CT - 3/10/20: No findings consistent with malignancy. \par \par CT Chest 4/7/22: Stable 3 mm RUL nodule. Stable scarring. \par

## 2023-04-25 NOTE — PHYSICAL EXAM
[Well Groomed] : well groomed [II] : Mallampati Class: II [Normal Appearance] : normal appearance [Normal S1, S2] : normal s1, s2 [No Resp Distress] : no resp distress [No Abnormalities] : no abnormalities [Not Tender] : not tender [No HSM] : no hsm [Normal Gait] : normal gait [No Clubbing] : no clubbing [No Edema] : no edema [No Focal Deficits] : no focal deficits [Oriented x3] : oriented x3 [Normal Oropharynx] : normal oropharynx [No Neck Mass] : no neck mass [Normal Rate/Rhythm] : normal rate/rhythm [Clear to Auscultation Bilaterally] : clear to auscultation bilaterally [Normal Affect] : normal affect

## 2023-04-25 NOTE — PROCEDURE
[FreeTextEntry1] : PFT 3/2/20: No obstruction. Moderate restriction. Mildly reduced diffusion. No change after bronchodilator.\par \par PFT 4/24/23: Moderate obstruction.  \par \par CT Chest ([facility]) 2/23/15: Scarring at the left lung base. No change from 8/2/12. \par \par CT Chest ([facility]) 12/16/17: Two subpleural nodules at the left base (specific size not reported). No change from prior CT of 2/15. \par \par CT Chest 1/8/20 ([facility]): Emphysematous changes. Two subpleural opacities in the left lower lobe measuring 12 x 6 mm and 9 x 7 mm. \par \par PET/CT - 3/10/20: No findings consistent with malignancy. \par \par CT Chest 4/7/22: Stable 3 mm RUL nodule. Stable scarring. \par

## 2024-01-08 NOTE — ED ADULT TRIAGE NOTE - BRAND OF COVID-19 VACCINATION
Order received for CT lung screening.  EMR and order reviewed. Information regarding ct lung screening and smoking cessation referral mailed to patient.   Moderna dose 1, 2, and 3
